# Patient Record
Sex: FEMALE | Race: WHITE | Employment: OTHER | URBAN - NONMETROPOLITAN AREA
[De-identification: names, ages, dates, MRNs, and addresses within clinical notes are randomized per-mention and may not be internally consistent; named-entity substitution may affect disease eponyms.]

---

## 2017-04-06 ENCOUNTER — TELEPHONE (OUTPATIENT)
Dept: PRIMARY CARE CLINIC | Age: 27
End: 2017-04-06

## 2017-04-06 RX ORDER — BENZONATATE 100 MG/1
100 CAPSULE ORAL 3 TIMES DAILY PRN
Qty: 30 CAPSULE | Refills: 0 | Status: SHIPPED | OUTPATIENT
Start: 2017-04-06 | End: 2017-04-13

## 2017-04-06 RX ORDER — AMOXICILLIN AND CLAVULANATE POTASSIUM 875; 125 MG/1; MG/1
1 TABLET, FILM COATED ORAL 2 TIMES DAILY
Qty: 20 TABLET | Refills: 0 | Status: SHIPPED | OUTPATIENT
Start: 2017-04-06 | End: 2017-04-16

## 2017-04-10 ENCOUNTER — OFFICE VISIT (OUTPATIENT)
Dept: PRIMARY CARE CLINIC | Age: 27
End: 2017-04-10
Payer: MEDICARE

## 2017-04-10 VITALS
RESPIRATION RATE: 20 BRPM | DIASTOLIC BLOOD PRESSURE: 72 MMHG | HEART RATE: 88 BPM | BODY MASS INDEX: 22.86 KG/M2 | SYSTOLIC BLOOD PRESSURE: 120 MMHG | TEMPERATURE: 98 F | OXYGEN SATURATION: 98 % | WEIGHT: 125 LBS

## 2017-04-10 DIAGNOSIS — R73.02 IMPAIRED GLUCOSE TOLERANCE: ICD-10-CM

## 2017-04-10 DIAGNOSIS — F84.0 AUTISM DISORDER: ICD-10-CM

## 2017-04-10 DIAGNOSIS — Z23 NEED FOR PROPHYLACTIC VACCINATION AGAINST DIPHTHERIA-TETANUS-PERTUSSIS (DTP): ICD-10-CM

## 2017-04-10 DIAGNOSIS — D75.89 MACROCYTOSIS: ICD-10-CM

## 2017-04-10 DIAGNOSIS — E03.1 CONGENITAL HYPOTHYROIDISM WITHOUT GOITER: ICD-10-CM

## 2017-04-10 DIAGNOSIS — E78.2 MIXED HYPERLIPIDEMIA: ICD-10-CM

## 2017-04-10 DIAGNOSIS — K59.09 CHRONIC CONSTIPATION: ICD-10-CM

## 2017-04-10 DIAGNOSIS — Z00.00 ROUTINE GENERAL MEDICAL EXAMINATION AT A HEALTH CARE FACILITY: Primary | ICD-10-CM

## 2017-04-10 LAB
FOLATE: >20 NG/ML (ref 4.8–37.3)
TSH SERPL DL<=0.05 MIU/L-ACNC: 2.6 UIU/ML (ref 0.27–4.2)
VITAMIN B-12: 835 PG/ML (ref 211–946)

## 2017-04-10 PROCEDURE — 99213 OFFICE O/P EST LOW 20 MIN: CPT | Performed by: FAMILY MEDICINE

## 2017-04-10 PROCEDURE — G8420 CALC BMI NORM PARAMETERS: HCPCS | Performed by: FAMILY MEDICINE

## 2017-04-10 PROCEDURE — 1036F TOBACCO NON-USER: CPT | Performed by: FAMILY MEDICINE

## 2017-04-10 PROCEDURE — G8427 DOCREV CUR MEDS BY ELIG CLIN: HCPCS | Performed by: FAMILY MEDICINE

## 2017-04-10 PROCEDURE — G0439 PPPS, SUBSEQ VISIT: HCPCS | Performed by: FAMILY MEDICINE

## 2017-04-10 RX ORDER — LEVOTHYROXINE SODIUM 0.05 MG/1
TABLET ORAL
Qty: 30 TABLET | Refills: 5 | Status: SHIPPED | OUTPATIENT
Start: 2017-04-10 | End: 2017-09-27 | Stop reason: SDUPTHER

## 2017-04-10 ASSESSMENT — ENCOUNTER SYMPTOMS
COUGH: 0
ABDOMINAL PAIN: 0
CONSTIPATION: 1
EYE PAIN: 0
CHEST TIGHTNESS: 0
SHORTNESS OF BREATH: 0
NAUSEA: 0
SORE THROAT: 0
DIARRHEA: 0
WHEEZING: 0
VOMITING: 0
TROUBLE SWALLOWING: 0
RHINORRHEA: 0
PHOTOPHOBIA: 0
COLOR CHANGE: 0

## 2017-09-27 ENCOUNTER — OFFICE VISIT (OUTPATIENT)
Dept: PRIMARY CARE CLINIC | Age: 27
End: 2017-09-27
Payer: MEDICARE

## 2017-09-27 VITALS
HEIGHT: 62 IN | RESPIRATION RATE: 18 BRPM | BODY MASS INDEX: 21.9 KG/M2 | TEMPERATURE: 98 F | OXYGEN SATURATION: 98 % | WEIGHT: 119 LBS | HEART RATE: 77 BPM

## 2017-09-27 DIAGNOSIS — R73.02 IMPAIRED GLUCOSE TOLERANCE: ICD-10-CM

## 2017-09-27 DIAGNOSIS — F84.0 AUTISM DISORDER: ICD-10-CM

## 2017-09-27 DIAGNOSIS — Z74.1 REQUIRES ASSISTANCE WITH ACTIVITIES OF DAILY LIVING (ADL): ICD-10-CM

## 2017-09-27 DIAGNOSIS — E03.1 CONGENITAL HYPOTHYROIDISM WITHOUT GOITER: ICD-10-CM

## 2017-09-27 DIAGNOSIS — F95.9 TIC DISORDER: Primary | ICD-10-CM

## 2017-09-27 DIAGNOSIS — E78.2 MIXED HYPERLIPIDEMIA: ICD-10-CM

## 2017-09-27 PROCEDURE — G8420 CALC BMI NORM PARAMETERS: HCPCS | Performed by: FAMILY MEDICINE

## 2017-09-27 PROCEDURE — 1036F TOBACCO NON-USER: CPT | Performed by: FAMILY MEDICINE

## 2017-09-27 PROCEDURE — 99358 PROLONG SERVICE W/O CONTACT: CPT | Performed by: FAMILY MEDICINE

## 2017-09-27 PROCEDURE — G8427 DOCREV CUR MEDS BY ELIG CLIN: HCPCS | Performed by: FAMILY MEDICINE

## 2017-09-27 PROCEDURE — 99213 OFFICE O/P EST LOW 20 MIN: CPT | Performed by: FAMILY MEDICINE

## 2017-09-27 RX ORDER — LEVOTHYROXINE SODIUM 0.03 MG/1
TABLET ORAL
Qty: 90 TABLET | Refills: 1 | Status: SHIPPED | OUTPATIENT
Start: 2017-09-27 | End: 2018-03-16 | Stop reason: SDUPTHER

## 2017-09-27 ASSESSMENT — ENCOUNTER SYMPTOMS
ABDOMINAL PAIN: 0
DIARRHEA: 0
WHEEZING: 0
VOMITING: 0
CHEST TIGHTNESS: 0
CONSTIPATION: 0
NAUSEA: 0

## 2017-10-25 DIAGNOSIS — E03.1 CONGENITAL HYPOTHYROIDISM WITHOUT GOITER: ICD-10-CM

## 2017-10-25 DIAGNOSIS — R73.02 IMPAIRED GLUCOSE TOLERANCE: ICD-10-CM

## 2017-10-25 DIAGNOSIS — E78.2 MIXED HYPERLIPIDEMIA: ICD-10-CM

## 2017-10-25 RX ORDER — NORETHINDRONE AND ETHINYL ESTRADIOL 1 MG-35MCG
KIT ORAL
Qty: 28 TABLET | Refills: 11 | Status: SHIPPED | OUTPATIENT
Start: 2017-10-25 | End: 2018-07-03 | Stop reason: SDUPTHER

## 2017-12-24 ENCOUNTER — OFFICE VISIT (OUTPATIENT)
Dept: URGENT CARE | Age: 27
End: 2017-12-24
Payer: MEDICARE

## 2017-12-24 VITALS
WEIGHT: 114 LBS | BODY MASS INDEX: 20.85 KG/M2 | TEMPERATURE: 98.1 F | SYSTOLIC BLOOD PRESSURE: 120 MMHG | OXYGEN SATURATION: 98 % | DIASTOLIC BLOOD PRESSURE: 70 MMHG | HEART RATE: 81 BPM | RESPIRATION RATE: 22 BRPM

## 2017-12-24 DIAGNOSIS — J10.1 INFLUENZA A: Primary | ICD-10-CM

## 2017-12-24 DIAGNOSIS — R52 BODY ACHES: ICD-10-CM

## 2017-12-24 LAB
INFLUENZA A ANTIBODY: POSITIVE
INFLUENZA B ANTIBODY: NEGATIVE

## 2017-12-24 PROCEDURE — 1036F TOBACCO NON-USER: CPT | Performed by: NURSE PRACTITIONER

## 2017-12-24 PROCEDURE — G8427 DOCREV CUR MEDS BY ELIG CLIN: HCPCS | Performed by: NURSE PRACTITIONER

## 2017-12-24 PROCEDURE — 87804 INFLUENZA ASSAY W/OPTIC: CPT | Performed by: NURSE PRACTITIONER

## 2017-12-24 PROCEDURE — G8420 CALC BMI NORM PARAMETERS: HCPCS | Performed by: NURSE PRACTITIONER

## 2017-12-24 PROCEDURE — G8484 FLU IMMUNIZE NO ADMIN: HCPCS | Performed by: NURSE PRACTITIONER

## 2017-12-24 PROCEDURE — 99213 OFFICE O/P EST LOW 20 MIN: CPT | Performed by: NURSE PRACTITIONER

## 2017-12-24 RX ORDER — OSELTAMIVIR PHOSPHATE 75 MG/1
75 CAPSULE ORAL 2 TIMES DAILY
Qty: 10 CAPSULE | Refills: 0 | Status: SHIPPED | OUTPATIENT
Start: 2017-12-24 | End: 2017-12-29

## 2017-12-24 ASSESSMENT — ENCOUNTER SYMPTOMS
GASTROINTESTINAL NEGATIVE: 1
TROUBLE SWALLOWING: 0
ALLERGIC/IMMUNOLOGIC NEGATIVE: 1
EYE REDNESS: 0
SORE THROAT: 0
CONSTIPATION: 0
EYES NEGATIVE: 1
SINUS PRESSURE: 0
ABDOMINAL DISTENTION: 0
RESPIRATORY NEGATIVE: 1
SHORTNESS OF BREATH: 0
ABDOMINAL PAIN: 0
WHEEZING: 0

## 2017-12-24 NOTE — PROGRESS NOTES
[address] [facility] E CARE  [address] [facility] [address]  Dept: [phone]  Loc: [phone]    Nicki Haynes is a 33 y.o. female who presents today for her medical conditions/complaints as noted below. Nicki Haynes is c/o of Congestion and Cough        HPI:     HPI  Pt presents to clinic with runny nose, cough for 2 days. Mom states child does not usually act any different when she is sick. States she is autistic and has trouble communicating. Results for orders placed or performed in visit on 12/24/17   POCT Influenza A/B   Result Value Ref Range    Influenza A Ab positive (A)     Influenza B Ab negative        Past Medical History:   Diagnosis Date    History of underactive thyroid       Past Surgical History:   Procedure Laterality Date    BREAST BIOPSY      Seveal years ago    OTHER SURGICAL HISTORY  7/8/15    fecal disempaction       Family History   Problem Relation Age of Onset    Cancer Father     Cancer Maternal Grandfather      ? Social History   Substance Use Topics    Smoking status: Never Smoker    Smokeless tobacco: Never Used    Alcohol use No      Current Outpatient Prescriptions   Medication Sig Dispense Refill    oseltamivir (TAMIFLU) 75 MG capsule Take 1 capsule by mouth 2 times daily for 5 days 10 capsule 0    TRINTELLIX 5 MG tablet TAKE 1 TABLET BY MOUTH ONCE DAILY 30 tablet 11    NORTREL 1/35, 28, 1-35 MG-MCG per tablet TAKE 1 TABLET BY MOUTH ONCE DAILY 28 tablet 11    levothyroxine (SYNTHROID) 25 MCG tablet Take 1 tablet daily 90 tablet 1    calcium carbonate (OSCAL) 500 MG TABS tablet Take 500 mg by mouth daily      docusate sodium (COLACE) 100 MG capsule Take 100 mg by mouth 2 times daily      FIBER FORMULA PO Take by mouth      polyethylene glycol (GLYCOLAX) powder Take 17 g by mouth daily       No current facility-administered medications for this visit.       Allergies   Allergen Reactions    health maintenance. Instructed to continue current medications, diet and exercise. Patient agreed with treatment plan. Follow up as directed. Patient Instructions   1. Take tamiflu as prescribed  2. Give tylenol/motrin as needed for fever and may alternate every 4-6 hours as needed  3.  Increase fluids and make sure she urinates at least every 12 hours        Electronically signed by Neris Houser CNP on 12/24/2017 at 3:52 PM

## 2018-03-05 ENCOUNTER — TELEPHONE (OUTPATIENT)
Dept: PRIMARY CARE CLINIC | Age: 28
End: 2018-03-05

## 2018-03-16 DIAGNOSIS — E78.2 MIXED HYPERLIPIDEMIA: ICD-10-CM

## 2018-03-16 DIAGNOSIS — R73.02 IMPAIRED GLUCOSE TOLERANCE: ICD-10-CM

## 2018-03-16 DIAGNOSIS — E03.1 CONGENITAL HYPOTHYROIDISM WITHOUT GOITER: ICD-10-CM

## 2018-03-16 RX ORDER — LEVOTHYROXINE SODIUM 0.03 MG/1
TABLET ORAL
Qty: 90 TABLET | Refills: 0 | Status: SHIPPED | OUTPATIENT
Start: 2018-03-16 | End: 2018-07-03 | Stop reason: SDUPTHER

## 2018-07-03 ENCOUNTER — OFFICE VISIT (OUTPATIENT)
Dept: PRIMARY CARE CLINIC | Age: 28
End: 2018-07-03
Payer: MEDICARE

## 2018-07-03 ENCOUNTER — TELEPHONE (OUTPATIENT)
Dept: PRIMARY CARE CLINIC | Age: 28
End: 2018-07-03

## 2018-07-03 VITALS
OXYGEN SATURATION: 98 % | HEART RATE: 68 BPM | BODY MASS INDEX: 20.66 KG/M2 | DIASTOLIC BLOOD PRESSURE: 82 MMHG | TEMPERATURE: 98 F | SYSTOLIC BLOOD PRESSURE: 98 MMHG | RESPIRATION RATE: 18 BRPM | HEIGHT: 64 IN | WEIGHT: 121 LBS

## 2018-07-03 DIAGNOSIS — F84.0 AUTISM DISORDER: ICD-10-CM

## 2018-07-03 DIAGNOSIS — R53.82 CHRONIC FATIGUE: ICD-10-CM

## 2018-07-03 DIAGNOSIS — L20.89 OTHER ATOPIC DERMATITIS: ICD-10-CM

## 2018-07-03 DIAGNOSIS — E78.2 MIXED HYPERLIPIDEMIA: ICD-10-CM

## 2018-07-03 DIAGNOSIS — F95.1 CHRONIC MOTOR TIC DISORDER: ICD-10-CM

## 2018-07-03 DIAGNOSIS — R53.82 CHRONIC FATIGUE: Primary | ICD-10-CM

## 2018-07-03 DIAGNOSIS — R73.02 IMPAIRED GLUCOSE TOLERANCE: ICD-10-CM

## 2018-07-03 DIAGNOSIS — Z74.1 REQUIRES ASSISTANCE WITH ACTIVITIES OF DAILY LIVING (ADL): ICD-10-CM

## 2018-07-03 DIAGNOSIS — E03.1 CONGENITAL HYPOTHYROIDISM WITHOUT GOITER: ICD-10-CM

## 2018-07-03 LAB
ALBUMIN SERPL-MCNC: 4.8 G/DL (ref 3.5–5.2)
ALP BLD-CCNC: 79 U/L (ref 35–104)
ALT SERPL-CCNC: 18 U/L (ref 5–33)
ANION GAP SERPL CALCULATED.3IONS-SCNC: 17 MMOL/L (ref 7–19)
AST SERPL-CCNC: 22 U/L (ref 5–32)
BASOPHILS ABSOLUTE: 0.1 K/UL (ref 0–0.2)
BASOPHILS RELATIVE PERCENT: 1 % (ref 0–1)
BILIRUB SERPL-MCNC: <0.2 MG/DL (ref 0.2–1.2)
BUN BLDV-MCNC: 11 MG/DL (ref 6–20)
CALCIUM SERPL-MCNC: 9.8 MG/DL (ref 8.6–10)
CHLORIDE BLD-SCNC: 101 MMOL/L (ref 98–111)
CO2: 22 MMOL/L (ref 22–29)
CREAT SERPL-MCNC: 0.6 MG/DL (ref 0.5–0.9)
EOSINOPHILS ABSOLUTE: 0 K/UL (ref 0–0.6)
EOSINOPHILS RELATIVE PERCENT: 0.6 % (ref 0–5)
FOLATE: >20 NG/ML (ref 4.8–37.3)
GFR NON-AFRICAN AMERICAN: >60
GLUCOSE BLD-MCNC: 82 MG/DL (ref 74–109)
HCT VFR BLD CALC: 43.1 % (ref 37–47)
HEMOGLOBIN: 14.4 G/DL (ref 12–16)
LYMPHOCYTES ABSOLUTE: 1.8 K/UL (ref 1.1–4.5)
LYMPHOCYTES RELATIVE PERCENT: 35.1 % (ref 20–40)
MCH RBC QN AUTO: 33 PG (ref 27–31)
MCHC RBC AUTO-ENTMCNC: 33.4 G/DL (ref 33–37)
MCV RBC AUTO: 98.6 FL (ref 81–99)
MONOCYTES ABSOLUTE: 0.5 K/UL (ref 0–0.9)
MONOCYTES RELATIVE PERCENT: 9.8 % (ref 0–10)
NEUTROPHILS ABSOLUTE: 2.7 K/UL (ref 1.5–7.5)
NEUTROPHILS RELATIVE PERCENT: 52.3 % (ref 50–65)
PDW BLD-RTO: 11.8 % (ref 11.5–14.5)
PLATELET # BLD: 285 K/UL (ref 130–400)
PMV BLD AUTO: 11.1 FL (ref 9.4–12.3)
POTASSIUM SERPL-SCNC: 3.8 MMOL/L (ref 3.5–5)
RBC # BLD: 4.37 M/UL (ref 4.2–5.4)
SODIUM BLD-SCNC: 140 MMOL/L (ref 136–145)
TOTAL PROTEIN: 8.1 G/DL (ref 6.6–8.7)
TSH SERPL DL<=0.05 MIU/L-ACNC: 2.84 UIU/ML (ref 0.27–4.2)
VITAMIN B-12: 1032 PG/ML (ref 211–946)
WBC # BLD: 5.1 K/UL (ref 4.8–10.8)

## 2018-07-03 PROCEDURE — G8420 CALC BMI NORM PARAMETERS: HCPCS | Performed by: FAMILY MEDICINE

## 2018-07-03 PROCEDURE — 99214 OFFICE O/P EST MOD 30 MIN: CPT | Performed by: FAMILY MEDICINE

## 2018-07-03 PROCEDURE — G8427 DOCREV CUR MEDS BY ELIG CLIN: HCPCS | Performed by: FAMILY MEDICINE

## 2018-07-03 PROCEDURE — 1036F TOBACCO NON-USER: CPT | Performed by: FAMILY MEDICINE

## 2018-07-03 RX ORDER — LEVOTHYROXINE SODIUM 0.03 MG/1
TABLET ORAL
Qty: 90 TABLET | Refills: 0 | Status: SHIPPED | OUTPATIENT
Start: 2018-07-03 | End: 2018-09-24 | Stop reason: SDUPTHER

## 2018-07-03 ASSESSMENT — PATIENT HEALTH QUESTIONNAIRE - PHQ9
SUM OF ALL RESPONSES TO PHQ9 QUESTIONS 1 & 2: 0
2. FEELING DOWN, DEPRESSED OR HOPELESS: 0
1. LITTLE INTEREST OR PLEASURE IN DOING THINGS: 0
SUM OF ALL RESPONSES TO PHQ QUESTIONS 1-9: 0

## 2018-07-03 NOTE — PROGRESS NOTES
Pulse 68   Temp 98 °F (36.7 °C) (Temporal)   Resp 18   Ht 5' 4\" (1.626 m)   Wt 121 lb (54.9 kg)   SpO2 98%   BMI 20.77 kg/m²     Physical Exam   Constitutional: She is oriented to person, place, and time. She appears well-developed and well-nourished. No distress. HENT:   Head: Normocephalic and atraumatic. Neck: No tracheal tenderness present. No tracheal deviation present. No thyroid mass and no thyromegaly present. Cardiovascular: Normal rate, regular rhythm and normal heart sounds. No murmur heard. Pulmonary/Chest: Effort normal and breath sounds normal. No stridor. No respiratory distress. She has no wheezes. She has no rales. Abdominal: Soft. Bowel sounds are normal. She exhibits no distension. There is no tenderness. Musculoskeletal:   No pretibial edema b/l. Neurological: She is alert and oriented to person, place, and time. Skin: Skin is warm and dry. Rash noted. Rash is maculopapular (dry skin rash diffusely on lower extremities). She is not diaphoretic. No cyanosis. Psychiatric:   Avoid and behavior of motor tics, no eye contact but pleasant with provider. Some fretful resistance noted on skin examination   Nursing note and vitals reviewed. Assessment:    ICD-10-CM ICD-9-CM    1. Chronic fatigue R53.82 780.79 TSH without Reflex      Comprehensive Metabolic Panel      TSH 3RD GENERATION      Vitamin B12 & Folate      CBC Auto Differential   2. Impaired glucose tolerance R73.02 790.22 levothyroxine (SYNTHROID) 25 MCG tablet      norethindrone-ethinyl estradiol (NORTREL 1/35, 28,) 1-35 MG-MCG per tablet      VORTIoxetine (TRINTELLIX) 5 MG tablet   3. Mixed hyperlipidemia E78.2 272.2 levothyroxine (SYNTHROID) 25 MCG tablet      norethindrone-ethinyl estradiol (NORTREL 1/35, 28,) 1-35 MG-MCG per tablet      VORTIoxetine (TRINTELLIX) 5 MG tablet   4.  Congenital hypothyroidism without goiter E03.1 243 levothyroxine (SYNTHROID) 25 MCG tablet      norethindrone-ethinyl estradiol Patient Instructions   Take 1/2 a pill of trintellix daily for 6 days, then every other day 1/2 a pill for 6 days total, then stop. So in 12 days, she will off the medicine. Get your labs checked in [address]. Use scent free lotion or vaseline twice daily on skin spots. Patient given educational handouts and has had all questions answered. Patient voices understanding and agrees to plans along with risks and benefits of plan. Patient is instructed to continue prior meds, diet, and exercise plans unless instructed otherwise. Patient agrees to follow up as instructed and sooner if needed. Patient agrees to go to ER if condition becomes emergent. Notes may be completed with dictation device and spelling errors may occur.       Return in about 3 months (around 10/3/2018) for annual.

## 2018-07-03 NOTE — TELEPHONE ENCOUNTER
Per Dr. Isabel Kwan,         Please send letter to pt to let them know everything looked fine and no changes to be made at this time. Mailed pt letter with above information and results.  PP, LPN

## 2018-07-05 LAB — TSH, 3RD GENERATION: 3.08 MU/L (ref 0.3–4)

## 2018-09-17 ENCOUNTER — TELEPHONE (OUTPATIENT)
Dept: PRIMARY CARE CLINIC | Age: 28
End: 2018-09-17

## 2018-11-19 ENCOUNTER — OFFICE VISIT (OUTPATIENT)
Dept: PRIMARY CARE CLINIC | Age: 28
End: 2018-11-19
Payer: MEDICARE

## 2018-11-19 VITALS
OXYGEN SATURATION: 98 % | HEART RATE: 79 BPM | SYSTOLIC BLOOD PRESSURE: 132 MMHG | DIASTOLIC BLOOD PRESSURE: 78 MMHG | WEIGHT: 120 LBS | BODY MASS INDEX: 21.26 KG/M2 | HEIGHT: 63 IN

## 2018-11-19 DIAGNOSIS — K59.04 FUNCTIONAL CONSTIPATION: Primary | ICD-10-CM

## 2018-11-19 DIAGNOSIS — E03.1 CONGENITAL HYPOTHYROIDISM WITHOUT GOITER: ICD-10-CM

## 2018-11-19 DIAGNOSIS — E78.2 MIXED HYPERLIPIDEMIA: ICD-10-CM

## 2018-11-19 DIAGNOSIS — R73.02 IMPAIRED GLUCOSE TOLERANCE: ICD-10-CM

## 2018-11-19 PROCEDURE — 99213 OFFICE O/P EST LOW 20 MIN: CPT | Performed by: FAMILY MEDICINE

## 2018-11-19 PROCEDURE — 1036F TOBACCO NON-USER: CPT | Performed by: FAMILY MEDICINE

## 2018-11-19 PROCEDURE — G0008 ADMIN INFLUENZA VIRUS VAC: HCPCS | Performed by: FAMILY MEDICINE

## 2018-11-19 PROCEDURE — G8427 DOCREV CUR MEDS BY ELIG CLIN: HCPCS | Performed by: FAMILY MEDICINE

## 2018-11-19 PROCEDURE — G8420 CALC BMI NORM PARAMETERS: HCPCS | Performed by: FAMILY MEDICINE

## 2018-11-19 PROCEDURE — 90686 IIV4 VACC NO PRSV 0.5 ML IM: CPT | Performed by: FAMILY MEDICINE

## 2018-11-19 PROCEDURE — G8482 FLU IMMUNIZE ORDER/ADMIN: HCPCS | Performed by: FAMILY MEDICINE

## 2018-11-19 RX ORDER — LEVOTHYROXINE SODIUM 0.03 MG/1
TABLET ORAL
Qty: 90 TABLET | Refills: 3 | Status: SHIPPED | OUTPATIENT
Start: 2018-11-19 | End: 2019-11-18 | Stop reason: SDUPTHER

## 2018-11-19 NOTE — PROGRESS NOTES
REFILLS UNTIL SEEN IN OFFICE. Medications Discontinued During This Encounter   Medication Reason    docusate sodium (COLACE) 100 MG capsule Therapy completed    levothyroxine (SYNTHROID) 25 MCG tablet REORDER     Patient Instructions   Stay on current medications. Patient given educational handouts and has had all questions answered. Patient voices understanding and agrees to plans along with risks and benefits of plan. Patient isinstructed to continue prior meds, diet, and exercise plans unless instructed otherwise. Patient agrees to follow up as instructed and sooner if needed. Patient agrees to go to ER if condition becomes emergent. Notesmay be completed with dictation device and spelling errors may occur. Return in about 6 months (around 5/19/2019) for medicare AW.

## 2019-03-11 ENCOUNTER — TELEPHONE (OUTPATIENT)
Dept: PRIMARY CARE CLINIC | Age: 29
End: 2019-03-11

## 2019-03-11 RX ORDER — LACTOSE
POWDER (GRAM) MISCELLANEOUS
Qty: 1 G | Refills: 0 | Status: SHIPPED | OUTPATIENT
Start: 2019-03-11 | End: 2020-02-13 | Stop reason: SDUPTHER

## 2019-03-12 ENCOUNTER — OFFICE VISIT (OUTPATIENT)
Dept: PRIMARY CARE CLINIC | Age: 29
End: 2019-03-12
Payer: MEDICARE

## 2019-03-12 ENCOUNTER — HOSPITAL ENCOUNTER (OUTPATIENT)
Dept: GENERAL RADIOLOGY | Age: 29
Discharge: HOME | End: 2019-03-12
Payer: MEDICARE

## 2019-03-12 VITALS
SYSTOLIC BLOOD PRESSURE: 126 MMHG | HEART RATE: 76 BPM | TEMPERATURE: 99 F | DIASTOLIC BLOOD PRESSURE: 72 MMHG | WEIGHT: 122 LBS | BODY MASS INDEX: 21.62 KG/M2 | OXYGEN SATURATION: 98 % | HEIGHT: 63 IN

## 2019-03-12 DIAGNOSIS — K59.03 DRUG-INDUCED CONSTIPATION: ICD-10-CM

## 2019-03-12 DIAGNOSIS — K59.03 DRUG-INDUCED CONSTIPATION: Primary | ICD-10-CM

## 2019-03-12 DIAGNOSIS — N92.6 MENSTRUAL ABNORMALITY: ICD-10-CM

## 2019-03-12 PROCEDURE — 1036F TOBACCO NON-USER: CPT | Performed by: FAMILY MEDICINE

## 2019-03-12 PROCEDURE — 74018 RADEX ABDOMEN 1 VIEW: CPT

## 2019-03-12 PROCEDURE — G8482 FLU IMMUNIZE ORDER/ADMIN: HCPCS | Performed by: FAMILY MEDICINE

## 2019-03-12 PROCEDURE — G8420 CALC BMI NORM PARAMETERS: HCPCS | Performed by: FAMILY MEDICINE

## 2019-03-12 PROCEDURE — G8427 DOCREV CUR MEDS BY ELIG CLIN: HCPCS | Performed by: FAMILY MEDICINE

## 2019-03-12 PROCEDURE — 99214 OFFICE O/P EST MOD 30 MIN: CPT | Performed by: FAMILY MEDICINE

## 2019-03-12 RX ORDER — LACTULOSE 10 G/15ML
SOLUTION ORAL
Qty: 270 ML | Refills: 0 | Status: SHIPPED | OUTPATIENT
Start: 2019-03-12 | End: 2020-02-13 | Stop reason: SDUPTHER

## 2019-03-12 ASSESSMENT — ENCOUNTER SYMPTOMS
CONSTIPATION: 1
COUGH: 0
SHORTNESS OF BREATH: 0
ABDOMINAL PAIN: 0
DIARRHEA: 0
CHEST TIGHTNESS: 0
WHEEZING: 0
VOMITING: 0
NAUSEA: 0
ABDOMINAL DISTENTION: 1

## 2019-04-05 ENCOUNTER — ANESTHESIA EVENT (OUTPATIENT)
Dept: OPERATING ROOM | Age: 29
End: 2019-04-05

## 2019-04-12 ENCOUNTER — HOSPITAL ENCOUNTER (OUTPATIENT)
Age: 29
Setting detail: OUTPATIENT SURGERY
Discharge: HOME | End: 2019-04-12
Attending: OPHTHALMOLOGY | Admitting: OPHTHALMOLOGY

## 2019-04-12 ENCOUNTER — ANESTHESIA (OUTPATIENT)
Dept: OPERATING ROOM | Age: 29
End: 2019-04-12

## 2019-04-12 VITALS
RESPIRATION RATE: 2 BRPM | OXYGEN SATURATION: 97 % | SYSTOLIC BLOOD PRESSURE: 118 MMHG | DIASTOLIC BLOOD PRESSURE: 57 MMHG

## 2019-04-12 VITALS
SYSTOLIC BLOOD PRESSURE: 114 MMHG | OXYGEN SATURATION: 97 % | HEART RATE: 73 BPM | WEIGHT: 122 LBS | RESPIRATION RATE: 18 BRPM | HEIGHT: 63 IN | BODY MASS INDEX: 21.62 KG/M2 | DIASTOLIC BLOOD PRESSURE: 71 MMHG

## 2019-04-12 PROCEDURE — 92018 COMPL OPH EXAM GENERAL ANES: CPT

## 2019-04-12 PROCEDURE — G8907 PT DOC NO EVENTS ON DISCHARG: HCPCS | Performed by: NURSE PRACTITIONER

## 2019-04-12 PROCEDURE — G8918 PT W/O PREOP ORDER IV AB PRO: HCPCS | Performed by: NURSE PRACTITIONER

## 2019-04-12 RX ORDER — SODIUM CHLORIDE, SODIUM LACTATE, POTASSIUM CHLORIDE, CALCIUM CHLORIDE 600; 310; 30; 20 MG/100ML; MG/100ML; MG/100ML; MG/100ML
INJECTION, SOLUTION INTRAVENOUS CONTINUOUS
Status: DISCONTINUED | OUTPATIENT
Start: 2019-04-12 | End: 2019-04-12 | Stop reason: HOSPADM

## 2019-04-12 RX ORDER — MIDAZOLAM HYDROCHLORIDE 1 MG/ML
INJECTION INTRAMUSCULAR; INTRAVENOUS PRN
Status: DISCONTINUED | OUTPATIENT
Start: 2019-04-12 | End: 2019-04-12 | Stop reason: SDUPTHER

## 2019-04-12 RX ORDER — PROPOFOL 10 MG/ML
INJECTION, EMULSION INTRAVENOUS PRN
Status: DISCONTINUED | OUTPATIENT
Start: 2019-04-12 | End: 2019-04-12 | Stop reason: SDUPTHER

## 2019-04-12 RX ORDER — LIDOCAINE HYDROCHLORIDE 10 MG/ML
INJECTION, SOLUTION INFILTRATION; PERINEURAL PRN
Status: DISCONTINUED | OUTPATIENT
Start: 2019-04-12 | End: 2019-04-12 | Stop reason: SDUPTHER

## 2019-04-12 RX ORDER — SODIUM CHLORIDE, SODIUM LACTATE, POTASSIUM CHLORIDE, CALCIUM CHLORIDE 600; 310; 30; 20 MG/100ML; MG/100ML; MG/100ML; MG/100ML
INJECTION, SOLUTION INTRAVENOUS CONTINUOUS PRN
Status: DISCONTINUED | OUTPATIENT
Start: 2019-04-12 | End: 2019-04-12 | Stop reason: SDUPTHER

## 2019-04-12 RX ADMIN — LIDOCAINE HYDROCHLORIDE 30 MG: 10 INJECTION, SOLUTION INFILTRATION; PERINEURAL at 07:02

## 2019-04-12 RX ADMIN — SODIUM CHLORIDE, SODIUM LACTATE, POTASSIUM CHLORIDE, CALCIUM CHLORIDE: 600; 310; 30; 20 INJECTION, SOLUTION INTRAVENOUS at 06:58

## 2019-04-12 RX ADMIN — PROPOFOL 150 MG: 10 INJECTION, EMULSION INTRAVENOUS at 07:02

## 2019-04-12 RX ADMIN — SODIUM CHLORIDE, SODIUM LACTATE, POTASSIUM CHLORIDE, CALCIUM CHLORIDE: 600; 310; 30; 20 INJECTION, SOLUTION INTRAVENOUS at 06:57

## 2019-04-12 RX ADMIN — MIDAZOLAM HYDROCHLORIDE 2 MG: 1 INJECTION INTRAMUSCULAR; INTRAVENOUS at 06:53

## 2019-04-12 NOTE — DISCHARGE SUMMARY
Physician Discharge Summary     Patient ID:  Chetna Villalobos  [id number]  49 y.o.  1990    Admit date: 4/12/2019    Discharge date and time: No discharge date for patient encounter. Admitting Physician: Vinay Vee MD     Discharge Physician: same    Admission Diagnoses: Autism    Discharge Diagnoses: Autism, Hyperopia, Astigmatism    Admission Condition: good    Discharged Condition: good    Indication for Admission: exam under anesthesia    Hospital Course: see op note    Discharge Exam:  /84   Pulse 84   Resp 18   Ht 5' 3\" (1.6 m)   Wt 122 lb (55.3 kg)   SpO2 93%   BMI 21.61 kg/m²   General appearance: alert, appears stated age and cooperative  Head: Normocephalic, without obvious abnormality, atraumatic  Lungs: clear to auscultation bilaterally  Heart: regular rate and rhythm, S1, S2 normal, no murmur, click, rub or gallop  Extremities: extremities normal, atraumatic, no cyanosis or edema  Neurologic: autism    Disposition: home    In process/preliminary results:  Outstanding Order Results     No orders found from 3/14/2019 to 4/13/2019. Patient Instructions:   Current Discharge Medication List      CONTINUE these medications which have NOT CHANGED    Details   lactulose (CHRONULAC) 10 GM/15ML solution Take 30 mL every 2 hrs up 3 times in 24 hrs until large BM produced  Qty: 270 mL, Refills: 0    Associated Diagnoses: Drug-induced constipation      Lactose POWD Take 20 mg  Every two hours for 2 days until produced a good movement  Qty: 1 g, Refills: 0      Docusate Calcium (STOOL SOFTENER PO) Take by mouth      levothyroxine (SYNTHROID) 25 MCG tablet TAKE 1 TABLET BY MOUTH ONCE DAILY  Qty: 90 tablet, Refills: 3    Comments: NO FURTHER REFILLS UNTIL SEEN IN OFFICE. Associated Diagnoses: Impaired glucose tolerance; Mixed hyperlipidemia;  Congenital hypothyroidism without goiter      norethindrone-ethinyl estradiol (NORTREL 1/35, 28,) 1-35 MG-MCG per tablet TAKE 1 TABLET BY MOUTH ONCE DAILY  Qty: 28 tablet, Refills: 11    Associated Diagnoses: Impaired glucose tolerance; Mixed hyperlipidemia; Congenital hypothyroidism without goiter      calcium carbonate (OSCAL) 500 MG TABS tablet Take 500 mg by mouth daily      FIBER FORMULA PO Take by mouth      polyethylene glycol (GLYCOLAX) powder Take 17 g by mouth daily           Activity: activity as tolerated  Diet: regular diet    Follow-up with [facility] in 1 week.     Signed:  Adele Fortune MD  4/12/2019  7:28 AM

## 2019-04-12 NOTE — BRIEF OP NOTE
Brief Postoperative Note  ______________________________________________________________    Patient: Lisa Montero  YOB: 1990  MRN: [id number]  Date of Procedure: 4/12/2019    Pre-Op Diagnosis: autism, exam under anesthesia    Post-Op Diagnosis: Same       Procedure(s):  EXAMINATION UNDER ANESTHESIA    Anesthesia: General    Surgeon(s):  Brando Coker MD    Complications: None      Findings: see op note    Brando Coker MD  Date: 4/12/2019  Time: 7:20 AM

## 2019-11-11 ENCOUNTER — IMMUNIZATION (OUTPATIENT)
Dept: URGENT CARE | Age: 29
End: 2019-11-11
Payer: MEDICARE

## 2019-11-11 PROCEDURE — G0008 ADMIN INFLUENZA VIRUS VAC: HCPCS | Performed by: NURSE PRACTITIONER

## 2019-11-11 PROCEDURE — 90686 IIV4 VACC NO PRSV 0.5 ML IM: CPT | Performed by: NURSE PRACTITIONER

## 2019-11-18 DIAGNOSIS — E03.1 CONGENITAL HYPOTHYROIDISM WITHOUT GOITER: ICD-10-CM

## 2019-11-18 DIAGNOSIS — E78.2 MIXED HYPERLIPIDEMIA: ICD-10-CM

## 2019-11-18 DIAGNOSIS — R73.02 IMPAIRED GLUCOSE TOLERANCE: ICD-10-CM

## 2019-11-18 RX ORDER — LEVOTHYROXINE SODIUM 0.03 MG/1
TABLET ORAL
Qty: 90 TABLET | Refills: 0 | Status: SHIPPED | OUTPATIENT
Start: 2019-11-18 | End: 2020-02-13 | Stop reason: SDUPTHER

## 2020-02-13 ENCOUNTER — OFFICE VISIT (OUTPATIENT)
Dept: PRIMARY CARE CLINIC | Age: 30
End: 2020-02-13
Payer: MEDICARE

## 2020-02-13 VITALS
WEIGHT: 113 LBS | BODY MASS INDEX: 20.02 KG/M2 | OXYGEN SATURATION: 99 % | HEART RATE: 98 BPM | TEMPERATURE: 98 F | RESPIRATION RATE: 20 BRPM | DIASTOLIC BLOOD PRESSURE: 72 MMHG | SYSTOLIC BLOOD PRESSURE: 120 MMHG

## 2020-02-13 DIAGNOSIS — R53.82 CHRONIC FATIGUE: ICD-10-CM

## 2020-02-13 DIAGNOSIS — E03.1 CONGENITAL HYPOTHYROIDISM WITHOUT GOITER: ICD-10-CM

## 2020-02-13 LAB
ALBUMIN SERPL-MCNC: 5.1 G/DL (ref 3.5–5.2)
ALP BLD-CCNC: 77 U/L (ref 35–104)
ALT SERPL-CCNC: 22 U/L (ref 5–33)
ANION GAP SERPL CALCULATED.3IONS-SCNC: 15 MMOL/L (ref 7–19)
AST SERPL-CCNC: 22 U/L (ref 5–32)
BILIRUB SERPL-MCNC: <0.2 MG/DL (ref 0.2–1.2)
BUN BLDV-MCNC: 10 MG/DL (ref 6–20)
CALCIUM SERPL-MCNC: 9.5 MG/DL (ref 8.6–10)
CHLORIDE BLD-SCNC: 103 MMOL/L (ref 98–111)
CO2: 23 MMOL/L (ref 22–29)
CREAT SERPL-MCNC: 0.7 MG/DL (ref 0.5–0.9)
GFR NON-AFRICAN AMERICAN: >60
GLUCOSE BLD-MCNC: 88 MG/DL (ref 74–109)
POTASSIUM SERPL-SCNC: 3.8 MMOL/L (ref 3.5–5)
SODIUM BLD-SCNC: 141 MMOL/L (ref 136–145)
TOTAL PROTEIN: 8.5 G/DL (ref 6.6–8.7)

## 2020-02-13 PROCEDURE — 1036F TOBACCO NON-USER: CPT | Performed by: FAMILY MEDICINE

## 2020-02-13 PROCEDURE — G8482 FLU IMMUNIZE ORDER/ADMIN: HCPCS | Performed by: FAMILY MEDICINE

## 2020-02-13 PROCEDURE — G8427 DOCREV CUR MEDS BY ELIG CLIN: HCPCS | Performed by: FAMILY MEDICINE

## 2020-02-13 PROCEDURE — G8420 CALC BMI NORM PARAMETERS: HCPCS | Performed by: FAMILY MEDICINE

## 2020-02-13 PROCEDURE — 99214 OFFICE O/P EST MOD 30 MIN: CPT | Performed by: FAMILY MEDICINE

## 2020-02-13 RX ORDER — LEVOTHYROXINE SODIUM 0.03 MG/1
TABLET ORAL
Qty: 90 TABLET | Refills: 0 | Status: SHIPPED | OUTPATIENT
Start: 2020-02-13 | End: 2020-06-04

## 2020-02-13 RX ORDER — LACTULOSE 10 G/15ML
SOLUTION ORAL
Qty: 270 ML | Refills: 0 | Status: SHIPPED | OUTPATIENT
Start: 2020-02-13 | End: 2022-03-15 | Stop reason: SDUPTHER

## 2020-02-13 RX ORDER — LACTOSE
POWDER (GRAM) MISCELLANEOUS
Qty: 1 G | Refills: 0 | Status: SHIPPED | OUTPATIENT
Start: 2020-02-13 | End: 2022-09-08 | Stop reason: ALTCHOICE

## 2020-02-13 ASSESSMENT — PATIENT HEALTH QUESTIONNAIRE - PHQ9
SUM OF ALL RESPONSES TO PHQ QUESTIONS 1-9: 0
SUM OF ALL RESPONSES TO PHQ QUESTIONS 1-9: 0
2. FEELING DOWN, DEPRESSED OR HOPELESS: 0
SUM OF ALL RESPONSES TO PHQ9 QUESTIONS 1 & 2: 0
1. LITTLE INTEREST OR PLEASURE IN DOING THINGS: 0

## 2020-02-13 ASSESSMENT — ENCOUNTER SYMPTOMS
NAUSEA: 0
WHEEZING: 0
ABDOMINAL PAIN: 0
SHORTNESS OF BREATH: 0
BLOOD IN STOOL: 0
COUGH: 0
DIARRHEA: 0
CONSTIPATION: 0
VOMITING: 0
CHEST TIGHTNESS: 0

## 2020-02-13 NOTE — PROGRESS NOTES
Olivia Carr is a 35 y.o. female who presents today for   Chief Complaint   Patient presents with    Medication Refill       HPI  Patient is here for medication refill. Pt's mother notes pt eats a lot of gluten-free products and that she has lost weight. Mother notes pt regularly uses Miralax and fiber supplement. She notes concern about cerumen in L ear. No change in PMH, family, social, or surgical history unless mentioned above. Review of Systems   Constitutional: Negative for chills and fever. Respiratory: Negative for cough, chest tightness, shortness of breath and wheezing. Cardiovascular: Negative for chest pain, palpitations and leg swelling. Gastrointestinal: Negative for abdominal pain, blood in stool, constipation, diarrhea, nausea and vomiting. Genitourinary: Negative for difficulty urinating, dysuria, frequency and hematuria. Past Medical History:   Diagnosis Date    History of underactive thyroid        Current Outpatient Medications   Medication Sig Dispense Refill    norethindrone-ethinyl estradiol ([place] Rival 1/35, 28,) 1-35 MG-MCG per tablet TAKE 1 TABLET BY MOUTH ONCE DAILY 28 tablet 1    levothyroxine (SYNTHROID) 25 MCG tablet Take one tablet daily 90 tablet 0    lactulose (CHRONULAC) 10 GM/15ML solution Take 30 mL every 2 hrs up 3 times in 24 hrs until large BM produced 270 mL 0    Lactose POWD Take 20 mg  Every two hours for 2 days until produced a good movement 1 g 0    Docusate Calcium (STOOL SOFTENER PO) Take by mouth      calcium carbonate (OSCAL) 500 MG TABS tablet Take 500 mg by mouth daily      FIBER FORMULA PO Take by mouth      polyethylene glycol (GLYCOLAX) powder Take 17 g by mouth daily       No current facility-administered medications for this visit.         Allergies   Allergen Reactions    Latex     Betadine [Povidone Iodine]     Sulfa Antibiotics        Past Surgical History:   Procedure Laterality Date    BREAST BIOPSY      Seveal years ago  OTHER SURGICAL HISTORY  7/8/15    fecal disempaction       Social History     Tobacco Use    Smoking status: Never Smoker    Smokeless tobacco: Never Used   Substance Use Topics    Alcohol use: No     Alcohol/week: 0.0 standard drinks    Drug use: No       Family History   Problem Relation Age of Onset    Cancer Father     Cancer Maternal Grandfather         ? /72   Pulse 98   Temp 98 °F (36.7 °C)   Resp 20   Wt 113 lb (51.3 kg)   SpO2 99%   BMI 20.02 kg/m²     Physical Exam  Vitals signs and nursing note reviewed. Constitutional:       General: She is not in acute distress. Appearance: She is well-developed. She is not diaphoretic. HENT:      Head: Normocephalic and atraumatic. Cardiovascular:      Rate and Rhythm: Normal rate and regular rhythm. Heart sounds: Normal heart sounds. No murmur. Pulmonary:      Effort: Pulmonary effort is normal. No respiratory distress. Breath sounds: Normal breath sounds. No wheezing or rales. Abdominal:      General: Bowel sounds are normal. There is no distension. Palpations: Abdomen is soft. Tenderness: There is no abdominal tenderness. Musculoskeletal:      Comments: No pretibial edema b/l. Skin:     General: Skin is warm and dry. Neurological:      Mental Status: She is alert and oriented to person, place, and time. Assessment:    ICD-10-CM    1. Autism disorder F84.0    2. Impaired glucose tolerance R73.02 norethindrone-ethinyl estradiol (NORTREL 1/35, 28,) 1-35 MG-MCG per tablet     levothyroxine (SYNTHROID) 25 MCG tablet   3. Mixed hyperlipidemia E78.2 norethindrone-ethinyl estradiol (NORTREL 1/35, 28,) 1-35 MG-MCG per tablet     levothyroxine (SYNTHROID) 25 MCG tablet   4.  Congenital hypothyroidism without goiter E03.1 norethindrone-ethinyl estradiol (NORTREL 1/35, 28,) 1-35 MG-MCG per tablet     levothyroxine (SYNTHROID) 25 MCG tablet     TSH 3RD GENERATION   5. Drug-induced constipation K59.03 lactulose (CHRONULAC) 10 GM/15ML solution   6. Chronic idiopathic constipation K59.04        Plan: Will need lab f/u based on high risk hypothyroidism. Continue current GI regimen. Orders Placed This Encounter   Procedures    TSH 3RD GENERATION     Standing Status:   Standing     Number of Occurrences:   15     Standing Expiration Date:   1/26/2031     Orders Placed This Encounter   Medications    norethindrone-ethinyl estradiol ([place] Flood 1/35, 28,) 1-35 MG-MCG per tablet     Sig: TAKE 1 TABLET BY MOUTH ONCE DAILY     Dispense:  28 tablet     Refill:  1     NO FURTHER REFILLS UNTIL SEEN IN OFFICE.  levothyroxine (SYNTHROID) 25 MCG tablet     Sig: Take one tablet daily     Dispense:  90 tablet     Refill:  0     $    lactulose (CHRONULAC) 10 GM/15ML solution     Sig: Take 30 mL every 2 hrs up 3 times in 24 hrs until large BM produced     Dispense:  270 mL     Refill:  0    Lactose POWD     Sig: Take 20 mg  Every two hours for 2 days until produced a good movement     Dispense:  1 g     Refill:  0     Medications Discontinued During This Encounter   Medication Reason    norethindrone-ethinyl estradiol (NORTREL 1/35, 28,) 1-35 MG-MCG per tablet REORDER    levothyroxine (SYNTHROID) 25 MCG tablet REORDER    lactulose (CHRONULAC) 10 GM/15ML solution REORDER    Lactose POWD REORDER     Patient Instructions   Get your labs checked in [address]. Patient given educational handouts and has had all questions answered. Patient voices understanding and agrees to plans along with risks and benefits of plan. Patient isinstructed to continue prior meds, diet, and exercise plans unless instructed otherwise. Patient agrees to follow up as instructed and sooner if needed. Patient agrees to go to ER if condition becomes emergent. Notesmay be completed with dictation device and spelling errors may occur. Tatyana Fuller am scribing for and in the presence of Dr. Darian Neil.  2/13/2020   IDr. Dre Bhatt, the medical provider for the encounter with patient on 2/23/2020 at 6:34 PM have reviewed my scribe's documentation in earnest and take sole ownership of the intellectual property represented in documentation by my medical scribe and myself within this document. Some errors may occur in proofreading. Return in about 6 months (around 8/13/2020) for medicare AWV.

## 2020-02-15 LAB — TSH, 3RD GENERATION: 1.69 MU/L (ref 0.3–4)

## 2020-02-20 ENCOUNTER — TELEPHONE (OUTPATIENT)
Dept: PRIMARY CARE CLINIC | Age: 30
End: 2020-02-20

## 2020-02-20 NOTE — LETTER
[address] [facility]  Phone: [phone]  Fax: [phone]        February 20, 2020    [address] [facility] [address]      Dear Flavio Go: Your provider has reviewed the results and at this time based on the results your current labs are considered within normal limits. There is no need to change your current treatment regimen.   If you have any further questions please contact our office at [phone]           Thank you         Edwin Zhao MA

## 2020-02-20 NOTE — TELEPHONE ENCOUNTER
----- Message from Adelso Riley MD sent at 2/20/2020  6:50 AM CST -----  Please send letter to pt to let them know everything looked fine and no changes to be made at this time.

## 2020-04-20 RX ORDER — NORETHINDRONE AND ETHINYL ESTRADIOL 1 MG-35MCG
KIT ORAL
Qty: 28 TABLET | Refills: 0 | Status: SHIPPED | OUTPATIENT
Start: 2020-04-20 | End: 2020-06-04

## 2020-08-27 ENCOUNTER — VIRTUAL VISIT (OUTPATIENT)
Dept: PRIMARY CARE CLINIC | Age: 30
End: 2020-08-27
Payer: MEDICARE

## 2020-08-27 PROCEDURE — G0439 PPPS, SUBSEQ VISIT: HCPCS | Performed by: NURSE PRACTITIONER

## 2020-08-27 RX ORDER — NORETHINDRONE AND ETHINYL ESTRADIOL 1 MG-35MCG
KIT ORAL
Qty: 28 TABLET | Refills: 2 | Status: SHIPPED | OUTPATIENT
Start: 2020-08-27 | End: 2020-11-15

## 2020-08-27 RX ORDER — LEVOTHYROXINE SODIUM 0.03 MG/1
TABLET ORAL
Qty: 90 TABLET | Refills: 0 | Status: SHIPPED | OUTPATIENT
Start: 2020-08-27 | End: 2020-11-15

## 2020-08-27 ASSESSMENT — PATIENT HEALTH QUESTIONNAIRE - PHQ9
SUM OF ALL RESPONSES TO PHQ QUESTIONS 1-9: 0
SUM OF ALL RESPONSES TO PHQ QUESTIONS 1-9: 0

## 2020-08-27 NOTE — PROGRESS NOTES
regularly involved in providing care):   Patient Care Team:  Yessi Farooq MD as PCP - General (Family Medicine)  Yessi Farooq MD as PCP - [facility]    Wt Readings from Last 3 Encounters:   02/13/20 113 lb (51.3 kg)   04/12/19 122 lb (55.3 kg)   03/12/19 122 lb (55.3 kg)      No flowsheet data found. There is no height or weight on file to calculate BMI. Based upon direct observation of the patient, evaluation of cognition reveals autism. Guardian reports the patient goes to [employer]. Patient's complete Health Risk Assessment and screening values have been reviewed and are found in Flowsheets. The following problems were reviewed today and where indicated follow up appointments were made and/or referrals ordered. Positive Risk Factor Screenings with Interventions:     General Health:  General  In general, how would you say your health is?: Very Good  In the past 7 days, have you experienced any of the following? New or Increased Pain, New or Increased Fatigue, Loneliness, Social Isolation, Stress or Anger?: None of These  Do you get the social and emotional support that you need?: Yes  Do you have a Living Will?: (!) No  General Health Risk Interventions:  · No Living Will: patient declined and guardian declined    Health Habits/Nutrition:  Health Habits/Nutrition  Do you exercise for at least 20 minutes 2-3 times per week?: (!) No  Have you lost any weight without trying in the past 3 months?: No  Do you eat fewer than 2 meals per day?: No  Have you seen a dentist within the past year?: (!) No  There is no height or weight on file to calculate BMI.   Health Habits/Nutrition Interventions:  · Inadequate physical activity:  patient agrees to increase physical activity as follows: more walking throughout the day  · Dental exam overdue:  patient encouraged to make appointment with his/her dentist    Hearing/Vision:  No exam data present  Hearing/Vision  Do you or your family notice any trouble with your hearing?: No  Do you have difficulty driving, watching TV, or doing any of your daily activities because of your eyesight?: No  Have you had an eye exam within the past year?: (!) No  Hearing/Vision Interventions:  · Vision concerns:  patient encouraged to make appointment with his/her eye specialist, last exam 2 years ago    ADL:  ADLs  In the past 7 days, did you need help from others to perform any of the following everyday activities? Eating, dressing, grooming, bathing, toileting, or walking/balance?: (!) Walking/Balance, Bathing  In the past 7 days, did you need help from others to take care of any of the following?  Laundry, housekeeping, banking/finances, shopping, telephone use, food preparation, transportation, or taking medications?: [employer], Housekeeping, Banking/Finances, Shopping, Telephone Use, Food Preparation, Transportation, Taking Medications  ADL Interventions:  · Patient declines any further evaluation/treatment for this issue  · guardian helps with many of the patients ADLs due to autism    Personalized Preventive Plan   Current Health Maintenance Status  Immunization History   Administered Date(s) Administered    Influenza Virus Vaccine 12/14/2015    Influenza, Avtar Colon, IM, PF (6 mo and older Fluzone, Flulaval, Fluarix, and 3 yrs and older Afluria) 10/12/2016, 11/19/2018, 11/11/2019    Tdap (Boostrix, Adacel) 04/10/2017        Health Maintenance   Topic Date Due    Varicella vaccine (1 of 2 - 2-dose childhood series) 02/08/1992    HIV screen  02/08/2006    Cervical cancer screen  02/08/2012    Annual Wellness Visit (AWV)  05/29/2019    Flu vaccine (1) 09/01/2020    TSH testing  02/13/2021    DTaP/Tdap/Td vaccine (2 - Td) 04/10/2027    Hepatitis A vaccine  Aged Out    Hepatitis B vaccine  Aged Out    Hib vaccine  Aged Out    Meningococcal (ACWY) vaccine  Aged Out    Pneumococcal 0-64 years Vaccine  Aged Out     Recommendations for GTx Due: see orders and patient instructions/AVS.  . Recommended screening schedule for the next 5-10 years is provided to the patient in written form: see Patient Instructions/AVS.    Diagnoses and all orders for this visit:    Routine general medical examination at a health care facility    Impaired glucose tolerance  -     levothyroxine (SYNTHROID) 25 MCG tablet; TAKE 1 TABLET BY MOUTH ONCE DAILY  -     norethindrone-ethinyl estradiol (NORTREL 1/35, 28,) 1-35 MG-MCG per tablet; Take 1 tablet by mouth once daily. Mixed hyperlipidemia  -     levothyroxine (SYNTHROID) 25 MCG tablet; TAKE 1 TABLET BY MOUTH ONCE DAILY  -     norethindrone-ethinyl estradiol (NORTREL 1/35, 28,) 1-35 MG-MCG per tablet; Take 1 tablet by mouth once daily. Congenital hypothyroidism without goiter  -     levothyroxine (SYNTHROID) 25 MCG tablet; TAKE 1 TABLET BY MOUTH ONCE DAILY  -     norethindrone-ethinyl estradiol (NORTREL 1/35, 28,) 1-35 MG-MCG per tablet; Take 1 tablet by mouth once daily. Carmelina Elaine is a 35 y.o. female being evaluated by a Virtual Visit (phone) encounter to address concerns as mentioned above. A caregiver was present when appropriate. Due to this being a TeleHealth encounter (During JAUCS-50 public health emergency), evaluation of the following organ systems was limited: Vitals/Constitutional/EENT/Resp/CV/GI//MS/Neuro/Skin/Heme-Lymph-Imm. Pursuant to the emergency declaration under the [address] [place] [address], [address] [place] [address] authority and the [employer] Act, this Virtual Visit was conducted with patient's (and/or legal guardian's) consent, to reduce the patient's risk of exposure to COVID-19 and provide necessary medical care. The patient (and/or legal guardian) has also been advised to contact this office for worsening conditions or problems, and seek emergency medical treatment and/or call 911 if deemed necessary. Patient identification was verified at the start of the visit: Yes    Services were provided through phone to substitute for in-person clinic visit. Patient and provider were located at their individual homes. --ASAD Carcamo NP on 8/27/2020 at 9:26 AM    An electronic signature was used to authenticate this note.

## 2020-08-27 NOTE — PATIENT INSTRUCTIONS
Personalized Preventive Plan for Elmo Ferguson - 8/27/2020  Medicare offers a range of preventive health benefits. Some of the tests and screenings are paid in full while other may be subject to a deductible, co-insurance, and/or copay. Some of these benefits include a comprehensive review of your medical history including lifestyle, illnesses that may run in your family, and various assessments and screenings as appropriate. After reviewing your medical record and screening and assessments performed today your provider may have ordered immunizations, labs, imaging, and/or referrals for you. A list of these orders (if applicable) as well as your Preventive Care list are included within your After Visit Summary for your review. Other Preventive Recommendations:    · A preventive eye exam performed by an eye specialist is recommended every 1-2 years to screen for glaucoma; cataracts, macular degeneration, and other eye disorders. · A preventive dental visit is recommended every 6 months. · Try to get at least 150 minutes of exercise per week or 10,000 steps per day on a pedometer . · Order or download the FREE \"Exercise & Physical Activity: Your Everyday Guide\" from [employer]. Call 7-424.146.7569 or search [employer] online. · You need 9842-3460 mg of calcium and 2483-2118 IU of vitamin D per day. It is possible to meet your calcium requirement with diet alone, but a vitamin D supplement is usually necessary to meet this goal.  · When exposed to the sun, use a sunscreen that protects against both UVA and UVB radiation with an SPF of 30 or greater. Reapply every 2 to 3 hours or after sweating, drying off with a towel, or swimming. · Always wear a seat belt when traveling in a car. Always wear a helmet when riding a bicycle or motorcycle.

## 2021-03-11 ENCOUNTER — VIRTUAL VISIT (OUTPATIENT)
Dept: PRIMARY CARE CLINIC | Age: 31
End: 2021-03-11
Payer: MEDICARE

## 2021-03-11 DIAGNOSIS — R73.02 IMPAIRED GLUCOSE TOLERANCE: ICD-10-CM

## 2021-03-11 DIAGNOSIS — E78.2 MIXED HYPERLIPIDEMIA: ICD-10-CM

## 2021-03-11 DIAGNOSIS — E03.1 CONGENITAL HYPOTHYROIDISM WITHOUT GOITER: Primary | ICD-10-CM

## 2021-03-11 PROBLEM — F84.0 AUTISM DISORDER: Chronic | Status: ACTIVE | Noted: 2017-04-10

## 2021-03-11 PROCEDURE — 1036F TOBACCO NON-USER: CPT | Performed by: FAMILY MEDICINE

## 2021-03-11 PROCEDURE — G8428 CUR MEDS NOT DOCUMENT: HCPCS | Performed by: FAMILY MEDICINE

## 2021-03-11 PROCEDURE — G8484 FLU IMMUNIZE NO ADMIN: HCPCS | Performed by: FAMILY MEDICINE

## 2021-03-11 PROCEDURE — G8421 BMI NOT CALCULATED: HCPCS | Performed by: FAMILY MEDICINE

## 2021-03-11 PROCEDURE — 99213 OFFICE O/P EST LOW 20 MIN: CPT | Performed by: FAMILY MEDICINE

## 2021-03-11 RX ORDER — LEVOTHYROXINE SODIUM 0.03 MG/1
25 TABLET ORAL DAILY
Qty: 90 TABLET | Refills: 3 | Status: SHIPPED | OUTPATIENT
Start: 2021-03-11 | End: 2022-03-21

## 2021-03-11 RX ORDER — NORETHINDRONE AND ETHINYL ESTRADIOL 1 MG-35MCG
KIT ORAL
Qty: 28 TABLET | Refills: 11 | Status: SHIPPED | OUTPATIENT
Start: 2021-03-11 | End: 2022-03-15

## 2021-03-11 NOTE — PROGRESS NOTES
Tobacco Use    Smoking status: Never Smoker    Smokeless tobacco: Never Used   Substance Use Topics    Alcohol use: No     Alcohol/week: 0.0 standard drinks    Drug use: No   ,   Family History   Problem Relation Age of Onset    Cancer Father     Cancer Maternal Grandfather         ?   ,   Immunization History   Administered Date(s) Administered    Influenza Virus Vaccine 12/14/2015, 11/08/2020    Influenza, Kavya Garcianing, IM, PF (6 mo and older Fluzone, Flulaval, Fluarix, and 3 yrs and older Afluria) 10/12/2016, 11/19/2018, 11/11/2019    Tdap (Boostrix, Adacel) 04/10/2017   ,   Health Maintenance   Topic Date Due    Hepatitis C screen  Never done    Varicella vaccine (1 of 2 - 2-dose childhood series) Never done    HIV screen  Never done    Cervical cancer screen  Never done    TSH testing  02/13/2021    Annual Wellness Visit (AWV)  08/28/2021    DTaP/Tdap/Td vaccine (2 - Td) 04/10/2027    Flu vaccine  Completed    Hepatitis A vaccine  Aged Out    Hepatitis B vaccine  Aged Out    Hib vaccine  Aged Out    Meningococcal (ACWY) vaccine  Aged Out    Pneumococcal 0-64 years Vaccine  Aged Out       PHYSICAL EXAMINATION:  [ INSTRUCTIONS:  \"[x]\" Indicates a positive item  \"[]\" Indicates a negative item  -- DELETE ALL ITEMS NOT EXAMINED]  Vital Signs: (As obtained by patient/caregiver or practitioner observation)    Blood pressure-  Heart rate-    Respiratory rate-    Temperature-  Pulse oximetry-     Constitutional: [x] Appears well-developed and well-nourished [] No apparent distress      [] Abnormal-   Mental status  [x] Alert and awake  [x] Oriented to person/place/time [x]Able to follow commands      Eyes:  EOM    [x]  Normal  [] Abnormal-  Sclera  [x]  Normal  [] Abnormal -         Discharge []  None visible  [] Abnormal -    HENT:   [x] Normocephalic, atraumatic.   [] Abnormal   [x] Mouth/Throat: Mucous membranes are moist.     External Ears [x] Normal  [] Abnormal-     Neck: [x] No visualized mass Pulmonary/Chest: [x] Respiratory effort normal.  [x] No visualized signs of difficulty breathing or respiratory distress        [] Abnormal-      Musculoskeletal:   [x] Normal gait with no signs of ataxia         [x] Normal range of motion of neck        [] Abnormal-       Neurological:        [x] No Facial Asymmetry (Cranial nerve 7 motor function) (limited exam to video visit)          [x] No gaze palsy        [] Abnormal-         Skin:        [x] No significant exanthematous lesions or discoloration noted on facial skin         [] Abnormal-            Psychiatric:       [x] Normal Affect [x] No Hallucinations        [] Abnormal-     Other pertinent observable physical exam findings-     ASSESSMENT/PLAN:    ICD-10-CM    1. Congenital hypothyroidism without goiter  E03.1 norethindrone-ethinyl estradiol ([facility] 1/35, 28,) 1-35 MG-MCG per tablet     levothyroxine (SYNTHROID) 25 MCG tablet     TSH without Reflex     CBC Auto Differential   2. Impaired glucose tolerance  R73.02 norethindrone-ethinyl estradiol (NORTREL 1/35, 28,) 1-35 MG-MCG per tablet     levothyroxine (SYNTHROID) 25 MCG tablet     Comprehensive Metabolic Panel     CBC Auto Differential   3. Mixed hyperlipidemia  E78.2 norethindrone-ethinyl estradiol (NORTREL 1/35, 28,) 1-35 MG-MCG per tablet     levothyroxine (SYNTHROID) 25 MCG tablet       rec covid shot. Patient received approximately 5 minutes of counseling on COVID-19 pandemic in regards to hygiene, symptoms, following public guidelines, and precautions to take. Patient received additional 5 minutes of counseling on covid vaccination data and need to vaccinate when available.         Orders Placed This Encounter   Medications    norethindrone-ethinyl estradiol (NORTREL 1/35, 28,) 1-35 MG-MCG per tablet     Sig: Take 1 tab daily     Dispense:  28 tablet     Refill:  11    levothyroxine (SYNTHROID) 25 MCG tablet     Sig: Take 1 tablet by mouth Daily     Dispense:  90 tablet     Refill:  3 Orders Placed This Encounter   Procedures    Comprehensive Metabolic Panel     Standing Status:   Future     Standing Expiration Date:   3/11/2022    TSH without Reflex     Standing Status:   Future     Standing Expiration Date:   3/11/2022    CBC Auto Differential     Standing Status:   Future     Standing Expiration Date:   3/11/2022       Return in about 6 months (around 9/11/2021) for OV (M-Wam), medicare AWV 2 time slots. Eliazar Izaguirre is a 28 y.o. female being evaluated by a Virtual Visit (video visit) encounter to address concerns as mentioned above. A caregiver was present when appropriate. Due to this being a TeleHealth encounter (During PFVKR-26 public health emergency), evaluation of the following organ systems was limited: Vitals/Constitutional/EENT/Resp/CV/GI//MS/Neuro/Skin/Heme-Lymph-Imm. Pursuant to the emergency declaration under the [address] [place] [address], [address] [place] [address] authority and the [employer] Act, this Virtual Visit was conducted with patient's (and/or legal guardian's) consent, to reduce the patient's risk of exposure to COVID-19 and provide necessary medical care. The patient (and/or legal guardian) has also been advised to contact this office for worsening conditions or problems, and seek emergency medical treatment and/or call 911 if deemed necessary. Services were provided through a video synchronous discussion virtually to substitute for in-person clinic visit. Patient and provider were located at their individual homes or provider at secure site for business. It is possible that material may be posted from a notepad that is used for a Dragon dictation device for dictating notes outside the EMR and I am the original author of all of this content. --Becky Herrera MD on 3/11/2021 at 10:19 AM    An electronic signature was used to authenticate this note.

## 2021-04-12 ENCOUNTER — IMMUNIZATION (OUTPATIENT)
Age: 31
End: 2021-04-12
Payer: MEDICARE

## 2021-04-12 PROCEDURE — 0001A PR IMM ADMN SARSCOV2 30MCG/0.3ML DIL RECON 1ST DOSE: CPT | Performed by: FAMILY MEDICINE

## 2021-04-12 PROCEDURE — 91300 COVID-19, PFIZER VACCINE 30MCG/0.3ML DOSE: CPT | Performed by: FAMILY MEDICINE

## 2021-05-03 ENCOUNTER — IMMUNIZATION (OUTPATIENT)
Age: 31
End: 2021-05-03
Payer: MEDICARE

## 2021-05-03 PROCEDURE — 0002A PR IMM ADMN SARSCOV2 30MCG/0.3ML DIL RECON 2ND DOSE: CPT | Performed by: FAMILY MEDICINE

## 2021-05-03 PROCEDURE — 91300 COVID-19, PFIZER VACCINE 30MCG/0.3ML DOSE: CPT | Performed by: FAMILY MEDICINE

## 2021-09-07 ENCOUNTER — OFFICE VISIT (OUTPATIENT)
Dept: PRIMARY CARE CLINIC | Age: 31
End: 2021-09-07
Payer: MEDICARE

## 2021-09-07 VITALS
BODY MASS INDEX: 20.02 KG/M2 | HEART RATE: 103 BPM | SYSTOLIC BLOOD PRESSURE: 130 MMHG | TEMPERATURE: 98.6 F | WEIGHT: 113 LBS | HEIGHT: 63 IN | OXYGEN SATURATION: 99 % | DIASTOLIC BLOOD PRESSURE: 84 MMHG

## 2021-09-07 DIAGNOSIS — F84.0 AUTISM DISORDER: ICD-10-CM

## 2021-09-07 DIAGNOSIS — E03.1 CONGENITAL HYPOTHYROIDISM WITHOUT GOITER: ICD-10-CM

## 2021-09-07 DIAGNOSIS — Z00.00 ROUTINE GENERAL MEDICAL EXAMINATION AT A HEALTH CARE FACILITY: Primary | ICD-10-CM

## 2021-09-07 DIAGNOSIS — E78.2 MIXED HYPERLIPIDEMIA: ICD-10-CM

## 2021-09-07 PROCEDURE — 1036F TOBACCO NON-USER: CPT | Performed by: FAMILY MEDICINE

## 2021-09-07 PROCEDURE — G0439 PPPS, SUBSEQ VISIT: HCPCS | Performed by: FAMILY MEDICINE

## 2021-09-07 PROCEDURE — G8427 DOCREV CUR MEDS BY ELIG CLIN: HCPCS | Performed by: FAMILY MEDICINE

## 2021-09-07 PROCEDURE — G0008 ADMIN INFLUENZA VIRUS VAC: HCPCS | Performed by: FAMILY MEDICINE

## 2021-09-07 PROCEDURE — G8420 CALC BMI NORM PARAMETERS: HCPCS | Performed by: FAMILY MEDICINE

## 2021-09-07 PROCEDURE — 90674 CCIIV4 VAC NO PRSV 0.5 ML IM: CPT | Performed by: FAMILY MEDICINE

## 2021-09-07 PROCEDURE — 99213 OFFICE O/P EST LOW 20 MIN: CPT | Performed by: FAMILY MEDICINE

## 2021-09-07 ASSESSMENT — PATIENT HEALTH QUESTIONNAIRE - PHQ9
SUM OF ALL RESPONSES TO PHQ QUESTIONS 1-9: 1
SUM OF ALL RESPONSES TO PHQ9 QUESTIONS 1 & 2: 1
2. FEELING DOWN, DEPRESSED OR HOPELESS: 1
SUM OF ALL RESPONSES TO PHQ QUESTIONS 1-9: 1
SUM OF ALL RESPONSES TO PHQ QUESTIONS 1-9: 1
1. LITTLE INTEREST OR PLEASURE IN DOING THINGS: 0

## 2021-09-07 ASSESSMENT — LIFESTYLE VARIABLES: HOW OFTEN DO YOU HAVE A DRINK CONTAINING ALCOHOL: 0

## 2021-09-07 NOTE — PATIENT INSTRUCTIONS
Personalized Preventive Plan for Keshav Fernandez - 9/7/2021  Medicare offers a range of preventive health benefits. Some of the tests and screenings are paid in full while other may be subject to a deductible, co-insurance, and/or copay. Some of these benefits include a comprehensive review of your medical history including lifestyle, illnesses that may run in your family, and various assessments and screenings as appropriate. After reviewing your medical record and screening and assessments performed today your provider may have ordered immunizations, labs, imaging, and/or referrals for you. A list of these orders (if applicable) as well as your Preventive Care list are included within your After Visit Summary for your review. Other Preventive Recommendations:    · A preventive eye exam performed by an eye specialist is recommended every 1-2 years to screen for glaucoma; cataracts, macular degeneration, and other eye disorders. · A preventive dental visit is recommended every 6 months. · Try to get at least 150 minutes of exercise per week or 10,000 steps per day on a pedometer . · Order or download the FREE \"Exercise & Physical Activity: Your Everyday Guide\" from [employer]. Call 5-401.292.5117 or search [employer] online. · You need 7032-7400 mg of calcium and 2577-3617 IU of vitamin D per day. It is possible to meet your calcium requirement with diet alone, but a vitamin D supplement is usually necessary to meet this goal.  · When exposed to the sun, use a sunscreen that protects against both UVA and UVB radiation with an SPF of 30 or greater. Reapply every 2 to 3 hours or after sweating, drying off with a towel, or swimming. · Always wear a seat belt when traveling in a car. Always wear a helmet when riding a bicycle or motorcycle.

## 2021-09-07 NOTE — PROGRESS NOTES
Roni Ralph is a 28 y.o. female who presents today for   Chief Complaint   Patient presents with    Medicare AWV       HPI  Patient is here for annual wellness visit as well as follow-up for chronic disease. Patient has been out of school for close to 1.5 years. This has been disruptive for her. She has been more easily frustrated. She does not have many outlets for her frustration and she has been biting her right hand as of such. Her guardian is concerned about some depression but they want to hold off on treatment at this time. The patient cannot elaborate well on her emotions. She has received the COBIT vaccine. She remains under treatment of congenital hypothyroidism. No change in PMH, family, social, or surgical history unless mentioned above. I have reviewed the above chief complaint and HPI details charted by staff and claim ownership of the documentation. Review of Systems   Unable to perform ROS: Psychiatric disorder       Past Medical History:   Diagnosis Date    History of underactive thyroid        Current Outpatient Medications   Medication Sig Dispense Refill    BIOTIN PO Take by mouth daily      norethindrone-ethinyl estradiol ([facility] 1/35, 28,) 1-35 MG-MCG per tablet Take 1 tab daily 28 tablet 11    levothyroxine (SYNTHROID) 25 MCG tablet Take 1 tablet by mouth Daily 90 tablet 3    Lactose POWD Take 20 mg  Every two hours for 2 days until produced a good movement 1 g 0    Docusate Calcium (STOOL SOFTENER PO) Take by mouth      calcium carbonate (OSCAL) 500 MG TABS tablet Take 500 mg by mouth daily      FIBER FORMULA PO Take by mouth      polyethylene glycol (GLYCOLAX) powder Take 17 g by mouth daily      lactulose (CHRONULAC) 10 GM/15ML solution Take 30 mL every 2 hrs up 3 times in 24 hrs until large BM produced (Patient not taking: Reported on 9/7/2021) 270 mL 0     No current facility-administered medications for this visit.        Allergies   Allergen Reactions    Latex  Betadine [Povidone Iodine]     Sulfa Antibiotics        Past Surgical History:   Procedure Laterality Date    BREAST BIOPSY      Seveal years ago    OTHER SURGICAL HISTORY  7/8/15    fecal disempaction       Social History     Tobacco Use    Smoking status: Never Smoker    Smokeless tobacco: Never Used   Substance Use Topics    Alcohol use: No     Alcohol/week: 0.0 standard drinks    Drug use: No       Family History   Problem Relation Age of Onset    Cancer Father     Cancer Maternal Grandfather         ? /84 (Site: Left Upper Arm, Position: Sitting)   Pulse 103   Temp 98.6 °F (37 °C)   Ht 5' 3\" (1.6 m)   Wt 113 lb (51.3 kg)   SpO2 99%   BMI 20.02 kg/m²     Physical Exam  Vitals and nursing note reviewed. Constitutional:       General: She is not in acute distress. Appearance: She is well-developed. She is not toxic-appearing or diaphoretic. HENT:      Head: Normocephalic and atraumatic. Not macrocephalic and not microcephalic. Right Ear: External ear normal. No decreased hearing noted. No drainage. Left Ear: External ear normal. No decreased hearing noted. No drainage. Nose: Nose normal. No nasal deformity or rhinorrhea. Mouth/Throat:      Mouth: Mucous membranes are not pale and not dry. Pharynx: Uvula midline. Eyes:      General: Lids are normal. No scleral icterus. Right eye: No discharge. Left eye: No discharge. Conjunctiva/sclera: Conjunctivae normal.      Pupils: Pupils are equal, round, and reactive to light. Neck:      Thyroid: No thyromegaly. Trachea: Phonation normal. No tracheal deviation. Cardiovascular:      Rate and Rhythm: Normal rate and regular rhythm. No extrasystoles are present. Heart sounds: Normal heart sounds, S1 normal and S2 normal. No murmur heard. Pulmonary:      Effort: Pulmonary effort is normal. No respiratory distress. Breath sounds: Normal breath sounds.  No wheezing, rhonchi or rales. Abdominal:      General: Bowel sounds are normal. There is no distension. Palpations: Abdomen is soft. Tenderness: There is no abdominal tenderness. There is no guarding. Musculoskeletal:         General: No tenderness. Normal range of motion. Cervical back: Normal range of motion and neck supple. No tenderness or bony tenderness. Thoracic back: Normal. No tenderness or bony tenderness. Lumbar back: Normal. No tenderness or bony tenderness. Right lower leg: No swelling. No edema. Left lower leg: No swelling. No edema. Lymphadenopathy:      Head:      Right side of head: No submental, submandibular or tonsillar adenopathy. Left side of head: No submental, submandibular or tonsillar adenopathy. Cervical: No cervical adenopathy. Upper Body:      Right upper body: No supraclavicular adenopathy. Left upper body: No supraclavicular adenopathy. Skin:     General: Skin is dry. Coloration: Skin is not pale. Findings: No erythema (on head, neck, face, extremities) or rash (on extremities, head, neck, face). Nails: There is no clubbing. Neurological:      Mental Status: She is alert and oriented to person, place, and time. Motor: No tremor or seizure activity. Gait: Gait normal.      Deep Tendon Reflexes: Reflexes are normal and symmetric. Psychiatric:         Mood and Affect: Mood is anxious. Speech: Speech is delayed. Behavior: Behavior is withdrawn. Thought Content: Thought content normal.         Judgment: Judgment is impulsive. Assessment:    ICD-10-CM    1. Routine general medical examination at a health care facility  Z00.00    2. Congenital hypothyroidism without goiter  E03.1 TSH without Reflex     Lipid Panel     Comprehensive Metabolic Panel   3. Mixed hyperlipidemia  E78.2 Lipid Panel   4.  Autism disorder  F84.0        Plan:     Patient needs laboratory follow-up for high risk

## 2021-09-07 NOTE — PROGRESS NOTES
Medicare Annual Wellness Visit  Name: Vanessa Sanchez Date: 2021   MRN: [id number] Sex: Female   Age: 28 y.o. Ethnicity: Non- / Non    : 1990 Race: White (non-)      Lesia Dobson is here for Medicare AWV    Screenings for behavioral, psychosocial and functional/safety risks, and cognitive dysfunction are all negative except as indicated below. These results, as well as other patient data from the [address] form, are documented in Flowsheets linked to this Encounter. Allergies   Allergen Reactions    Latex     Betadine [Povidone Iodine]     Sulfa Antibiotics          Prior to Visit Medications    Medication Sig Taking? Authorizing Provider   BIOTIN PO Take by mouth daily Yes Historical Provider, MD   norethindrone-ethinyl estradiol (Amanda Enriquez ,) 1-35 MG-MCG per tablet Take 1 tab daily Yes Manisha Glover MD   levothyroxine (SYNTHROID) 25 MCG tablet Take 1 tablet by mouth Daily Yes Manisha Glover MD   Lactose POWD Take 20 mg  Every two hours for 2 days until produced a good movement Yes Manisha Glover MD   Docusate Calcium (STOOL SOFTENER PO) Take by mouth Yes Historical Provider, MD   calcium carbonate (OSCAL) 500 MG TABS tablet Take 500 mg by mouth daily Yes Historical Provider, MD   FIBER FORMULA PO Take by mouth Yes Historical Provider, MD   polyethylene glycol (GLYCOLAX) powder Take 17 g by mouth daily Yes Historical Provider, MD   lactulose (CHRONULAC) 10 GM/15ML solution Take 30 mL every 2 hrs up 3 times in 24 hrs until large BM produced  Patient not taking: Reported on 2021  Manisha Glover MD         Past Medical History:   Diagnosis Date    History of underactive thyroid        Past Surgical History:   Procedure Laterality Date    BREAST BIOPSY      Seveal years ago    OTHER SURGICAL HISTORY  7/8/15    fecal disempaction         Family History   Problem Relation Age of Onset    Cancer Father     Cancer Maternal Grandfather         ? CareTeam (Including outside providers/suppliers regularly involved in providing care):   Patient Care Team:  Reggie Lantigua MD as PCP - General (Family Medicine)  Reggie Lantigua MD as PCP - [facility] Empaneled Provider    Wt Readings from Last 3 Encounters:   09/07/21 113 lb (51.3 kg)   02/13/20 113 lb (51.3 kg)   04/12/19 122 lb (55.3 kg)     Vitals:    09/07/21 1455   BP: 130/84   Site: Left Upper Arm   Position: Sitting   Pulse: 103   Temp: 98.6 °F (37 °C)   SpO2: 99%   Weight: 113 lb (51.3 kg)   Height: 5' 3\" (1.6 m)     Body mass index is 20.02 kg/m². Based upon direct observation of the patient, evaluation of cognition reveals recent and remote memory intact. Patient's complete Health Risk Assessment and screening values have been reviewed and are found in Flowsheets. The following problems were reviewed today and where indicated follow up appointments were made and/or referrals ordered. Positive Risk Factor Screenings with Interventions:            General Health and ACP:  General  In general, how would you say your health is?: Excellent  In the past 7 days, have you experienced any of the following?  New or Increased Pain, New or Increased Fatigue, Loneliness, Social Isolation, Stress or Anger?: (!) Loneliness, Social Isolation  Do you get the social and emotional support that you need?: Yes  Do you have a Living Will?: (!) No  Advance Directives     Power of [address] [place] [address] Will ACP-Advance Directive ACP-Power of     Not on File Not on File Not on File Filed      General Health Risk Interventions:  · Loneliness: patient declines any further intervention for this issue  · Social isolation: patient declines any further intervention for this issue  · No Living Will: Advance Care Planning addressed with patient today    Health Habits/Nutrition:  Health Habits/Nutrition  Do you exercise for at least 20 minutes 2-3 times per week?: (!) No  Have you lost any weight without trying in the past 3 months?: No  Do you eat only one meal per day?: No  Have you seen the dentist within the past year?: Appointment is scheduled  Body mass index: 20.01  Health Habits/Nutrition Interventions:  · Inadequate physical activity:  educational materials provided to promote increased physical activity     Safety:  Safety  Do you have working smoke detectors?: Yes  Have all throw rugs been removed or fastened?: (!) No  Do you have non-slip mats or surfaces in all bathtubs/showers?: Yes  Do all of your stairways have a railing or banister?:  (no stairs)  Are your doorways, halls and stairs free of clutter?: Yes  Do you always fasten your seatbelt when you are in a car?: Yes  Safety Interventions:  · Home safety tips provided    ADL:  ADLs  In the past 7 days, did you need help from others to perform any of the following everyday activities? Eating, dressing, grooming, bathing, toileting, or walking/balance?: (!) Bathing, Eating, Dressing, Grooming, Walking/Balance, Toileting  In the past 7 days, did you need help from others to take care of any of the following?  Laundry, housekeeping, banking/finances, shopping, telephone use, food preparation, transportation, or taking medications?: (!) Taking Medications, Transportation, Food Preparation, Banking/Finances, Housekeeping, Laundry  ADL Interventions:  · Patient declines any further evaluation/treatment for this issue   · Special needs has guardian to help    Personalized Preventive Plan   Current Health Maintenance Status  Immunization History   Administered Date(s) Administered    COVID-19, [place], PF, 30mcg/0.3mL 04/12/2021, 05/03/2021    Influenza Virus Vaccine 12/14/2015, 11/08/2020    Influenza, MDCK Quadv, IM, PF (Flucelvax 2 yrs and older) 09/07/2021    Influenza, Yanelis Hurt, IM, PF (6 mo and older Fluzone, Flulaval, Fluarix, and 3 yrs and older Afluria) 10/12/2016, 11/19/2018, 11/11/2019    Tdap (Boostrix, Adacel) 04/10/2017        Health Maintenance   Topic Date Due  Hepatitis C screen  Never done    Varicella vaccine (1 of 2 - 2-dose childhood series) Never done    HIV screen  Never done    Cervical cancer screen  Never done    TSH testing  02/13/2021    Annual Wellness Visit (AWV)  08/28/2021    DTaP/Tdap/Td vaccine (2 - Td or Tdap) 04/10/2027    Flu vaccine  Completed    COVID-19 Vaccine  Completed    Hepatitis A vaccine  Aged Out    Hepatitis B vaccine  Aged Out    Hib vaccine  Aged Out    Meningococcal (ACWY) vaccine  Aged Out    Pneumococcal 0-64 years Vaccine  Aged Out     Recommendations for [employer] Due: see orders and patient instructions/AVS.  . Recommended screening schedule for the next 5-10 years is provided to the patient in written form: see Patient Omar Chavarria was seen today for medicare awv. Diagnoses and all orders for this visit:    Routine general medical examination at a health care facility    Congenital hypothyroidism without goiter  -     TSH without Reflex; Future  -     Lipid Panel; Future  -     Comprehensive Metabolic Panel; Future    Mixed hyperlipidemia  -     Lipid Panel;  Future    Autism disorder    Other orders  -     INFLUENZA, MDCK QUADV, 2 YRS AND OLDER, IM, PF, PREFILL SYR OR SDV, 0.5ML (FLUCELVAX QUADV, PF)

## 2022-03-15 ENCOUNTER — OFFICE VISIT (OUTPATIENT)
Dept: PRIMARY CARE CLINIC | Age: 32
End: 2022-03-15
Payer: MEDICARE

## 2022-03-15 VITALS
HEIGHT: 63 IN | RESPIRATION RATE: 18 BRPM | HEART RATE: 93 BPM | SYSTOLIC BLOOD PRESSURE: 132 MMHG | TEMPERATURE: 97.8 F | DIASTOLIC BLOOD PRESSURE: 84 MMHG | OXYGEN SATURATION: 98 % | WEIGHT: 107 LBS | BODY MASS INDEX: 18.96 KG/M2

## 2022-03-15 DIAGNOSIS — F84.0 AUTISM DISORDER: ICD-10-CM

## 2022-03-15 DIAGNOSIS — M41.34 THORACOGENIC SCOLIOSIS OF THORACIC REGION: ICD-10-CM

## 2022-03-15 DIAGNOSIS — Z74.1 REQUIRES ASSISTANCE WITH ACTIVITIES OF DAILY LIVING (ADL): Primary | ICD-10-CM

## 2022-03-15 DIAGNOSIS — E03.1 CONGENITAL HYPOTHYROIDISM WITHOUT GOITER: ICD-10-CM

## 2022-03-15 DIAGNOSIS — K59.03 DRUG-INDUCED CONSTIPATION: ICD-10-CM

## 2022-03-15 PROCEDURE — G8427 DOCREV CUR MEDS BY ELIG CLIN: HCPCS | Performed by: FAMILY MEDICINE

## 2022-03-15 PROCEDURE — 1036F TOBACCO NON-USER: CPT | Performed by: FAMILY MEDICINE

## 2022-03-15 PROCEDURE — 99214 OFFICE O/P EST MOD 30 MIN: CPT | Performed by: FAMILY MEDICINE

## 2022-03-15 PROCEDURE — G8482 FLU IMMUNIZE ORDER/ADMIN: HCPCS | Performed by: FAMILY MEDICINE

## 2022-03-15 PROCEDURE — G8420 CALC BMI NORM PARAMETERS: HCPCS | Performed by: FAMILY MEDICINE

## 2022-03-15 RX ORDER — LACTULOSE 10 G/15ML
SOLUTION ORAL
Qty: 270 ML | Refills: 0 | Status: SHIPPED | OUTPATIENT
Start: 2022-03-15 | End: 2022-06-08 | Stop reason: SDUPTHER

## 2022-03-15 RX ORDER — MEDROXYPROGESTERONE ACETATE 150 MG/ML
150 INJECTION, SUSPENSION INTRAMUSCULAR
Qty: 1 ML | Refills: 3 | Status: SHIPPED | OUTPATIENT
Start: 2022-03-15 | End: 2022-06-08 | Stop reason: ALTCHOICE

## 2022-03-15 NOTE — PROGRESS NOTES
Otis Monae is a 33 y.o. female who presents today for   Chief Complaint   Patient presents with    6 Month Follow-Up     Pt's guardian says she has no concerns        HPI  Patient is here for f/u autism. She has had a slow down on stools, working on  training her, they have backed off on miralax. Her diet has improved veg/fruit. She is with a new guardian and doing quite well. She needs an order for gloves and wipes. Ongoing incontinence remains an issue. They are working on potty training. Has scoliosis. Tylenol as needed. No change in PMH, family, social, or surgical history unless mentioned above. I have reviewed the above chief complaint and HPI details charted by staff and claim ownership of the documentation. Review of Systems   Unable to perform ROS: Patient nonverbal       Past Medical History:   Diagnosis Date    History of underactive thyroid        Current Outpatient Medications   Medication Sig Dispense Refill    Misc. Devices MISC Wipes and gloves  Dx:  Fecal incontinence 120 each 11    lactulose (CHRONULAC) 10 GM/15ML solution Take 30 mL every 2 hrs up 3 times in 24 hrs until large BM produced 270 mL 0    medroxyPROGESTERone (DEPO-PROVERA) 150 MG/ML injection Inject 1 mL into the muscle every 3 months 1 mL 3    levothyroxine (SYNTHROID) 25 MCG tablet Take 1 tablet by mouth Daily 90 tablet 3    Docusate Calcium (STOOL SOFTENER PO) Take by mouth      calcium carbonate (OSCAL) 500 MG TABS tablet Take 500 mg by mouth daily      FIBER FORMULA PO Take by mouth      polyethylene glycol (GLYCOLAX) powder Take 17 g by mouth daily      BIOTIN PO Take by mouth daily (Patient not taking: Reported on 3/15/2022)      Lactose POWD Take 20 mg  Every two hours for 2 days until produced a good movement (Patient not taking: Reported on 3/15/2022) 1 g 0     No current facility-administered medications for this visit.        Allergies   Allergen Reactions    Latex     Betadine M41.34        Plan:   Overall doing well, transition well to a new guardian. Patient to have incontinence supplies and continue with potty training though. I do believe improve diet has been helping. Recommended continual Tylenol usage and potentially physical therapy if needed. Continue current guardianship. No orders of the defined types were placed in this encounter. Orders Placed This Encounter   Medications    Misc. Devices MISC     Sig: Wipes and gloves  Dx:  Fecal incontinence     Dispense:  120 each     Refill:  11    lactulose (CHRONULAC) 10 GM/15ML solution     Sig: Take 30 mL every 2 hrs up 3 times in 24 hrs until large BM produced     Dispense:  270 mL     Refill:  0    medroxyPROGESTERone (DEPO-PROVERA) 150 MG/ML injection     Sig: Inject 1 mL into the muscle every 3 months     Dispense:  1 mL     Refill:  3     Can add verbal order for needles and syringe if needed     Medications Discontinued During This Encounter   Medication Reason    lactulose (CHRONULAC) 10 GM/15ML solution REORDER    norethindrone-ethinyl estradiol (NORTREL 1/35, 28,) 1-35 MG-MCG per tablet LIST CLEANUP     Patient Instructions   Jc nogueira can be good for chiropractic care. Patient given educational handouts and has had all questions answered. Patient voices understanding and agrees to plans along with risks and benefits of plan. Patient isinstructed to continue prior meds, diet, and exercise plans unless instructed otherwise. Patient agrees to follow up as instructed and sooner if needed. Patient agrees to go to ER if condition becomes emergent. Notesmay be completed with dictation device and spelling errors may occur. Materials may be copied and pasted from a notepad outside of EMR, all of which, I, Dr. Christina Valdovinos MD, take sole intellectual ownership of and have approved adding to my note. Return in about 6 months (around 9/15/2022) for vv.

## 2022-03-21 DIAGNOSIS — E78.2 MIXED HYPERLIPIDEMIA: ICD-10-CM

## 2022-03-21 DIAGNOSIS — E03.1 CONGENITAL HYPOTHYROIDISM WITHOUT GOITER: ICD-10-CM

## 2022-03-21 DIAGNOSIS — R73.02 IMPAIRED GLUCOSE TOLERANCE: ICD-10-CM

## 2022-03-21 RX ORDER — LEVOTHYROXINE SODIUM 0.03 MG/1
TABLET ORAL
Qty: 90 TABLET | Refills: 0 | Status: SHIPPED | OUTPATIENT
Start: 2022-03-21 | End: 2022-04-01

## 2022-04-01 DIAGNOSIS — R73.02 IMPAIRED GLUCOSE TOLERANCE: ICD-10-CM

## 2022-04-01 DIAGNOSIS — E03.1 CONGENITAL HYPOTHYROIDISM WITHOUT GOITER: ICD-10-CM

## 2022-04-01 DIAGNOSIS — E78.2 MIXED HYPERLIPIDEMIA: ICD-10-CM

## 2022-04-01 RX ORDER — LEVOTHYROXINE SODIUM 0.03 MG/1
TABLET ORAL
Qty: 90 TABLET | Refills: 0 | Status: SHIPPED | OUTPATIENT
Start: 2022-04-01 | End: 2022-06-08 | Stop reason: SDUPTHER

## 2022-04-01 NOTE — TELEPHONE ENCOUNTER
Kirsty Eaton called to request a refill on her medication.       Last office visit : 3/15/2022   Next office visit : 9/12/2022     Requested Prescriptions     Signed Prescriptions Disp Refills    levothyroxine (SYNTHROID) 25 MCG tablet 90 tablet 0     Sig: TAKE 1 TABLET BY MOUTH ONCE DAILY     Authorizing Provider: Mariano Turner     Ordering User: Magnolia Poe MA

## 2022-04-29 ENCOUNTER — TELEPHONE (OUTPATIENT)
Dept: PRIMARY CARE CLINIC | Age: 32
End: 2022-04-29

## 2022-04-29 NOTE — TELEPHONE ENCOUNTER
----- Message from Jadon Rodrigez sent at 4/80/8865 11:11 AM CDT -----  Subject: Message to Provider    QUESTIONS  Information for Provider? Barby Maurice reached out to advised   that KEATON Caputo will be admitted to [facility] and they are   requesting an order for a list of medication [facility]) submitted faxed   [phone] Attn; Nolan Ruffin   ---------------------------------------------------------------------------  --------------  [address]  What is the best way for the office to contact you? OK to leave message on   voicemail  Preferred Call Back Phone Number? [phone]  ---------------------------------------------------------------------------  --------------  SCRIPT ANSWERS  Relationship to Patient? Guardian  Representative Name? Monik Maurice  Is the Representative on the appropriate HIPAA document in Epic?  Yes

## 2022-05-03 ENCOUNTER — TELEPHONE (OUTPATIENT)
Dept: PRIMARY CARE CLINIC | Age: 32
End: 2022-05-03

## 2022-05-03 NOTE — TELEPHONE ENCOUNTER
I had already faxed this so I called [facility] and verified their fax number. I then sent the fax while on the phone with Mary Payne. He said he will call me back to let me know if he receives it.

## 2022-05-03 NOTE — TELEPHONE ENCOUNTER
----- Message from Balbir Mcadams sent at 5/3/2022  1:31 PM CDT -----  Subject: Message to Provider    QUESTIONS  Information for Provider? Yamile Johnson needs to have faxed a list of her   medications to [facility] fax number is [phone] Attn; Shyanne Boo WITH HER NAME ON IT. please advise   ---------------------------------------------------------------------------  --------------  CALL BACK INFO  What is the best way for the office to contact you? OK to leave message on   voicemail  Preferred Call Back Phone Number? [phone]  ---------------------------------------------------------------------------  --------------  SCRIPT ANSWERS  Relationship to Patient?  Self

## 2022-06-08 ENCOUNTER — OFFICE VISIT (OUTPATIENT)
Dept: PRIMARY CARE CLINIC | Age: 32
End: 2022-06-08
Payer: MEDICARE

## 2022-06-08 VITALS
WEIGHT: 104.4 LBS | HEIGHT: 63 IN | DIASTOLIC BLOOD PRESSURE: 88 MMHG | TEMPERATURE: 98.4 F | BODY MASS INDEX: 18.5 KG/M2 | OXYGEN SATURATION: 99 % | SYSTOLIC BLOOD PRESSURE: 122 MMHG | HEART RATE: 86 BPM

## 2022-06-08 DIAGNOSIS — E78.2 MIXED HYPERLIPIDEMIA: ICD-10-CM

## 2022-06-08 DIAGNOSIS — Z74.1 REQUIRES ASSISTANCE WITH ACTIVITIES OF DAILY LIVING (ADL): ICD-10-CM

## 2022-06-08 DIAGNOSIS — G89.29 CHRONIC BILATERAL BACK PAIN, UNSPECIFIED BACK LOCATION: ICD-10-CM

## 2022-06-08 DIAGNOSIS — M54.9 CHRONIC BILATERAL BACK PAIN, UNSPECIFIED BACK LOCATION: ICD-10-CM

## 2022-06-08 DIAGNOSIS — Z00.00 WELL ADULT EXAM: Primary | ICD-10-CM

## 2022-06-08 DIAGNOSIS — R73.02 IMPAIRED GLUCOSE TOLERANCE: ICD-10-CM

## 2022-06-08 DIAGNOSIS — M41.34 THORACOGENIC SCOLIOSIS OF THORACIC REGION: ICD-10-CM

## 2022-06-08 DIAGNOSIS — K59.03 DRUG-INDUCED CONSTIPATION: ICD-10-CM

## 2022-06-08 DIAGNOSIS — E03.1 CONGENITAL HYPOTHYROIDISM WITHOUT GOITER: ICD-10-CM

## 2022-06-08 DIAGNOSIS — F84.0 AUTISM DISORDER: ICD-10-CM

## 2022-06-08 DIAGNOSIS — Z00.00 ROUTINE GENERAL MEDICAL EXAMINATION AT A HEALTH CARE FACILITY: ICD-10-CM

## 2022-06-08 PROCEDURE — 86580 TB INTRADERMAL TEST: CPT | Performed by: NURSE PRACTITIONER

## 2022-06-08 PROCEDURE — 99395 PREV VISIT EST AGE 18-39: CPT | Performed by: NURSE PRACTITIONER

## 2022-06-08 RX ORDER — ACETAMINOPHEN 500 MG
500 TABLET ORAL 4 TIMES DAILY PRN
Qty: 120 TABLET | Refills: 0 | Status: SHIPPED | OUTPATIENT
Start: 2022-06-08 | End: 2022-09-08 | Stop reason: ALTCHOICE

## 2022-06-08 RX ORDER — DOCUSATE SODIUM 100 MG/1
100 CAPSULE, LIQUID FILLED ORAL DAILY
Qty: 30 CAPSULE | Refills: 5 | Status: SHIPPED | OUTPATIENT
Start: 2022-06-08 | End: 2022-07-08

## 2022-06-08 RX ORDER — LEVOTHYROXINE SODIUM 0.03 MG/1
TABLET ORAL
Qty: 90 TABLET | Refills: 2 | Status: SHIPPED | OUTPATIENT
Start: 2022-06-08

## 2022-06-08 RX ORDER — MEDROXYPROGESTERONE ACETATE 150 MG/ML
150 INJECTION, SUSPENSION INTRAMUSCULAR
Qty: 1 ML | Refills: 5 | Status: SHIPPED | OUTPATIENT
Start: 2022-06-08 | End: 2022-09-08 | Stop reason: SDUPTHER

## 2022-06-08 RX ORDER — CYCLOBENZAPRINE HCL 10 MG
10 TABLET ORAL NIGHTLY
Qty: 30 TABLET | Refills: 1 | Status: SHIPPED | OUTPATIENT
Start: 2022-06-08 | End: 2022-07-19 | Stop reason: SDUPTHER

## 2022-06-08 RX ORDER — LACTULOSE 10 G/15ML
SOLUTION ORAL
Qty: 270 ML | Refills: 0 | Status: SHIPPED | OUTPATIENT
Start: 2022-06-08

## 2022-06-08 RX ORDER — POLYETHYLENE GLYCOL 3350 17 G/17G
17 POWDER, FOR SOLUTION ORAL DAILY PRN
Qty: 850 G | Refills: 2 | Status: SHIPPED | OUTPATIENT
Start: 2022-06-08 | End: 2022-07-08

## 2022-06-08 ASSESSMENT — PATIENT HEALTH QUESTIONNAIRE - PHQ9
1. LITTLE INTEREST OR PLEASURE IN DOING THINGS: 0
SUM OF ALL RESPONSES TO PHQ QUESTIONS 1-9: 0
SUM OF ALL RESPONSES TO PHQ9 QUESTIONS 1 & 2: 0
2. FEELING DOWN, DEPRESSED OR HOPELESS: 0

## 2022-06-08 NOTE — PROGRESS NOTES
[facility]  [address]  [address] [facility] [address]  Dept:[phone]  Dept Fax: [phone]  Loc: [phone]        Ivett Rizzo is a 33 y.o. female who presents today for her medical conditions/complaints as noted below. Ivett Rizzo is c/o Medication Check (pt needs a script for tylenol, fiber) and Follow-up (pt has a list of foods to eat from [facility], but she eats regular food.)        Chief Complaint   Patient presents with    Medication Check     pt needs a script for tylenol, fiber    Follow-up     pt has a list of foods to eat from [facility], but she eats regular food. HPI:     HPI    Well exam    Patient here with current guardian. She is being transferred to new guardian and will be in a facility that dispenses medications to patient. Current care provider is the daughter-in-law all of her previous care provider who passed away. 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 She does not participate in conversations occurring about her care. Patient does not look at provider during exam or history taking. New care provider request all scripts be printed so she may take these to the facility. Patient reports that they have been compliant with taking medicationsas directed.      Past Medical History:   Diagnosis Date    History of underactive thyroid        Past Surgical History:   Procedure Laterality Date    BREAST BIOPSY      Seveal years ago    OTHER SURGICAL HISTORY  7/8/15    fecal disempaction       Social History     Socioeconomic History    Marital status: Single     Spouse name: None    Number of children: None    Years of education: None    Highest education level: None   Occupational History    None   Tobacco Use    Smoking status: Never Smoker    Smokeless tobacco: Never Used   Substance and Sexual Activity    Alcohol use: No     Alcohol/week: 0.0 standard drinks    Drug use: No    Sexual activity: Never   Other Topics Concern    None   Social History Narrative    None     Social Determinants of Health     Financial Resource Strain:     Difficulty of Paying Living Expenses: Not on file   Food Insecurity:     Worried About Running Out of Food in the Last Year: Not on file    Himanshu of Food in the Last Year: Not on file   Transportation Needs:     Lack of Transportation (Medical): Not on file    Lack of Transportation (Non-Medical): Not on file   Physical Activity:     Days of Exercise per Week: Not on file    Minutes of Exercise per Session: Not on file   Stress:     Feeling of Stress : Not on file   Social Connections:     Frequency of Communication with Friends and Family: Not on file    Frequency of Social Gatherings with Friends and Family: Not on file    Attends [employer] Services: Not on file    Active Member of [address] [place] [address] or Organizations: Not on file    Attends Club or Organization Meetings: Not on file    Marital Status: Not on file   Intimate Partner Violence:     Fear of Current or Ex-Partner: Not on file    Emotionally Abused: Not on file    Physically Abused: Not on file    Sexually Abused: Not on file   Housing Stability:     Unable to Pay for Housing in the Last Year: Not on file    Number of [place] in the Last Year: Not on file    Unstable Housing in the Last Year: Not on file       Family History   Problem Relation Age of Onset    Cancer Father     Cancer Maternal Grandfather         ?        Current Outpatient Medications   Medication Sig Dispense Refill    docusate sodium (STOOL SOFTENER) 100 mg capsule Take 1 capsule by mouth daily 30 capsule 5    lactulose (CHRONULAC) 10 GM/15ML solution Take 30 mL every 2 hrs up 3 times in 24 hrs until large BM produced 270 mL 0    levothyroxine (SYNTHROID) 25 MCG tablet TAKE 1 TABLET BY MOUTH ONCE DAILY 90 tablet 2    polyethylene glycol (GLYCOLAX) 17 GM/SCOOP powder Take 17 g by mouth daily as needed (constipation) 850 g 2    acetaminophen (TYLENOL) 500 MG tablet Take 1 tablet by mouth 4 times daily as needed for Pain 120 tablet 0    cyclobenzaprine (FLEXERIL) 10 mg tablet Take 1 tablet by mouth nightly 30 tablet 1    medroxyPROGESTERone (DEPO-PROVERA) 150 MG/ML injection Inject 1 mL into the muscle every 3 months 1 mL 5    Misc. Devices MISC Wipes and gloves  Dx:  Fecal incontinence 120 each 11    BIOTIN PO Take by mouth daily (Patient not taking: Reported on 3/15/2022)      Lactose POWD Take 20 mg  Every two hours for 2 days until produced a good movement (Patient not taking: Reported on 3/15/2022) 1 g 0     No current facility-administered medications for this visit. Allergies   Allergen Reactions    Latex     Betadine [Povidone Iodine]     Sulfa Antibiotics        Lab Review not applicable    Subjective:   Review of Systems   Unable to perform ROS: Patient nonverbal   Musculoskeletal: Positive for back pain (per caregiver report). Objective:     Physical Exam  Vitals and nursing note reviewed. Constitutional:       General: She is not in acute distress. Appearance: She is well-developed. She is not toxic-appearing or diaphoretic. HENT:      Head: Normocephalic and atraumatic. Not macrocephalic and not microcephalic. Right Ear: External ear normal. No decreased hearing noted. No drainage. Left Ear: External ear normal. No decreased hearing noted. No drainage. Nose: Nose normal. No nasal deformity or rhinorrhea. Mouth/Throat:      Mouth: Mucous membranes are not pale and not dry. Pharynx: Uvula midline. Eyes:      General: Lids are normal. No scleral icterus. Right eye: No discharge. Left eye: No discharge. Conjunctiva/sclera: Conjunctivae normal.      Pupils: Pupils are equal, round, and reactive to light. Neck:      Thyroid: No thyromegaly. Trachea: Phonation normal. No tracheal deviation.    Cardiovascular:      Rate and Rhythm: Normal rate and regular rhythm. No extrasystoles are present. Heart sounds: Normal heart sounds, S1 normal and S2 normal. No murmur heard. Pulmonary:      Effort: Pulmonary effort is normal. No respiratory distress. Breath sounds: Normal breath sounds. No wheezing, rhonchi or rales. Chest:   Breasts:      Right: No supraclavicular adenopathy. Left: No supraclavicular adenopathy. Abdominal:      General: Bowel sounds are normal. There is no distension. Palpations: Abdomen is soft. Tenderness: There is no abdominal tenderness. There is no guarding. Musculoskeletal:         General: No tenderness. Normal range of motion. Cervical back: Normal range of motion and neck supple. No tenderness or bony tenderness. Thoracic back: Normal. No tenderness or bony tenderness. Lumbar back: Normal. No tenderness or bony tenderness. Right lower leg: No swelling. No edema. Left lower leg: No swelling. No edema. Lymphadenopathy:      Head:      Right side of head: No submental, submandibular or tonsillar adenopathy. Left side of head: No submental, submandibular or tonsillar adenopathy. Cervical: No cervical adenopathy. Upper Body:      Right upper body: No supraclavicular adenopathy. Left upper body: No supraclavicular adenopathy. Skin:     General: Skin is dry. Coloration: Skin is not pale. Findings: No erythema (on head, neck, face, extremities) or rash (on extremities, head, neck, face). Nails: There is no clubbing. Neurological:      Mental Status: She is alert and oriented to person, place, and time. Motor: No tremor or seizure activity. Gait: Gait normal.      Deep Tendon Reflexes: Reflexes are normal and symmetric. Psychiatric:         Mood and Affect: Mood is anxious. Speech: Speech is delayed. Behavior: Behavior is withdrawn. Thought Content:  Thought content normal.         Judgment: Judgment is impulsive. /88   Pulse 86   Temp 98.4 °F (36.9 °C) (Temporal)   Ht 5' 3\" (1.6 m)   Wt 104 lb 6.4 oz (47.4 kg)   SpO2 99%   BMI 18.49 kg/m²     Assessment:      Diagnosis Orders   1. Well adult exam  DIET GENERAL    Mantoux testing   2. Routine general medical examination at a health care facility  medroxyPROGESTERone (DEPO-PROVERA) 150 MG/ML injection   3. Autism disorder     4. Requires assistance with activities of daily living (ADL)     5. Thoracogenic scoliosis of thoracic region     6. Chronic bilateral back pain, unspecified back location  acetaminophen (TYLENOL) 500 MG tablet    cyclobenzaprine (FLEXERIL) 10 mg tablet   7. Drug-induced constipation  docusate sodium (STOOL SOFTENER) 100 mg capsule    lactulose (CHRONULAC) 10 GM/15ML solution    polyethylene glycol (GLYCOLAX) 17 GM/SCOOP powder   8. Impaired glucose tolerance  levothyroxine (SYNTHROID) 25 MCG tablet   9. Mixed hyperlipidemia  levothyroxine (SYNTHROID) 25 MCG tablet   10. Congenital hypothyroidism without goiter  levothyroxine (SYNTHROID) 25 MCG tablet     No results found for this visit on 06/08/22. Plan:     1. Well adult exam    2. Routine general medical examination at a health care facility    3. Autism disorder    4. Requires assistance with activities of daily living (ADL)    5. Thoracogenic scoliosis of thoracic region    6. Chronic bilateral back pain, unspecified back location    7. Drug-induced constipation    8. Impaired glucose tolerance    9. Mixed hyperlipidemia    10. Congenital hypothyroidism without goiter      Over 50% of the total visit time of 60 minutes was spent on counseling and or coordination of care of well adult exam, routine general medical exam, autism disorder, requires assistance with daily living, scoliosis, chronic bilateral back pain, drug-induced constipation, impaired glucose tolerance, mixed hyperlipidemia, and congenital hypothyroidism.       Return in about 3 months (around 9/8/2022). Orders Placed This Encounter   Procedures    Mantoux testing    DIET GENERAL     Regular diet     Standing Status:   Standing     Number of Occurrences:   360     Standing Expiration Date:   6/8/2023     Scheduling Instructions:      Needs dietary order for admission to [facility]. Not admitted to hospital.     Orders Placed This Encounter   Medications    docusate sodium (STOOL SOFTENER) 100 mg capsule     Sig: Take 1 capsule by mouth daily     Dispense:  30 capsule     Refill:  5    lactulose (CHRONULAC) 10 GM/15ML solution     Sig: Take 30 mL every 2 hrs up 3 times in 24 hrs until large BM produced     Dispense:  270 mL     Refill:  0    levothyroxine (SYNTHROID) 25 MCG tablet     Sig: TAKE 1 TABLET BY MOUTH ONCE DAILY     Dispense:  90 tablet     Refill:  2    polyethylene glycol (GLYCOLAX) 17 GM/SCOOP powder     Sig: Take 17 g by mouth daily as needed (constipation)     Dispense:  850 g     Refill:  2    acetaminophen (TYLENOL) 500 MG tablet     Sig: Take 1 tablet by mouth 4 times daily as needed for Pain     Dispense:  120 tablet     Refill:  0    cyclobenzaprine (FLEXERIL) 10 mg tablet     Sig: Take 1 tablet by mouth nightly     Dispense:  30 tablet     Refill:  1    medroxyPROGESTERone (DEPO-PROVERA) 150 MG/ML injection     Sig: Inject 1 mL into the muscle every 3 months     Dispense:  1 mL     Refill:  5     Can add verbal order for needles and syringe if needed       Patient Instructions   Begin flexeril 10mg at bedtime. [facility]. Patient/family given educational materials - see patient instructions. Discusseduse, benefit, and side effects of prescribed medications. All patient/family questions answered and voiced understanding. Instructed to continuecurrent medications, diet and exercise. Pt/family agreed with treatment plan. Follow up as directed and sooner if needed.      Patient/ family instructed that is symptoms worsen or persist they are to contact office or report to nearest ER. They voice understanding and agreement withthis plan.      Electronically signed by ASAD Vidal on 6/13/2022 at 9:53 AM MARKYT

## 2022-06-08 NOTE — PATIENT INSTRUCTIONS
Begin flexeril 10mg at bedtime. Link for [employer] [employer] [facility] to send TB skin test picture.

## 2022-06-08 NOTE — PROGRESS NOTES
After obtaining consent, and per orders of ASAD Cid, injection of tuberculin given in Right arm by Roberto Carlos Campa. Patient instructed to remain in clinic for 20 minutes afterwards, and to report any adverse reaction to me immediately.

## 2022-06-13 PROBLEM — M54.9 CHRONIC BILATERAL BACK PAIN: Status: ACTIVE | Noted: 2022-06-13

## 2022-06-13 PROBLEM — K59.03 DRUG-INDUCED CONSTIPATION: Status: ACTIVE | Noted: 2022-06-13

## 2022-06-13 PROBLEM — G89.29 CHRONIC BILATERAL BACK PAIN: Status: ACTIVE | Noted: 2022-06-13

## 2022-06-13 PROBLEM — Z00.00 WELL ADULT EXAM: Status: ACTIVE | Noted: 2022-06-13

## 2022-06-13 ASSESSMENT — ENCOUNTER SYMPTOMS: BACK PAIN: 1

## 2022-07-13 PROBLEM — Z00.00 ROUTINE GENERAL MEDICAL EXAMINATION AT A HEALTH CARE FACILITY: Status: RESOLVED | Noted: 2022-06-13 | Resolved: 2022-07-13

## 2022-07-19 DIAGNOSIS — M54.9 CHRONIC BILATERAL BACK PAIN, UNSPECIFIED BACK LOCATION: ICD-10-CM

## 2022-07-19 DIAGNOSIS — G89.29 CHRONIC BILATERAL BACK PAIN, UNSPECIFIED BACK LOCATION: ICD-10-CM

## 2022-07-19 RX ORDER — CYCLOBENZAPRINE HCL 5 MG
5 TABLET ORAL NIGHTLY PRN
Qty: 30 TABLET | Refills: 1 | Status: SHIPPED | OUTPATIENT
Start: 2022-07-19 | End: 2022-08-18

## 2022-09-08 ENCOUNTER — OFFICE VISIT (OUTPATIENT)
Dept: PRIMARY CARE CLINIC | Age: 32
End: 2022-09-08
Payer: MEDICARE

## 2022-09-08 VITALS
WEIGHT: 116.2 LBS | HEIGHT: 63 IN | SYSTOLIC BLOOD PRESSURE: 122 MMHG | DIASTOLIC BLOOD PRESSURE: 78 MMHG | TEMPERATURE: 97.9 F | OXYGEN SATURATION: 99 % | HEART RATE: 81 BPM | BODY MASS INDEX: 20.59 KG/M2

## 2022-09-08 DIAGNOSIS — K59.03 DRUG-INDUCED CONSTIPATION: ICD-10-CM

## 2022-09-08 DIAGNOSIS — F84.0 AUTISM DISORDER: Chronic | ICD-10-CM

## 2022-09-08 DIAGNOSIS — N92.6 IRREGULAR MENSTRUAL CYCLE: Primary | ICD-10-CM

## 2022-09-08 DIAGNOSIS — Z74.1 REQUIRES ASSISTANCE WITH ACTIVITIES OF DAILY LIVING (ADL): ICD-10-CM

## 2022-09-08 DIAGNOSIS — F41.1 GAD (GENERALIZED ANXIETY DISORDER): ICD-10-CM

## 2022-09-08 PROCEDURE — G8420 CALC BMI NORM PARAMETERS: HCPCS | Performed by: FAMILY MEDICINE

## 2022-09-08 PROCEDURE — 1036F TOBACCO NON-USER: CPT | Performed by: FAMILY MEDICINE

## 2022-09-08 PROCEDURE — 99214 OFFICE O/P EST MOD 30 MIN: CPT | Performed by: FAMILY MEDICINE

## 2022-09-08 PROCEDURE — G8427 DOCREV CUR MEDS BY ELIG CLIN: HCPCS | Performed by: FAMILY MEDICINE

## 2022-09-08 RX ORDER — BUSPIRONE HYDROCHLORIDE 5 MG/1
5 TABLET ORAL 2 TIMES DAILY
Qty: 60 TABLET | Refills: 0 | Status: SHIPPED | OUTPATIENT
Start: 2022-09-08 | End: 2022-10-04

## 2022-09-08 RX ORDER — MEDROXYPROGESTERONE ACETATE 150 MG/ML
150 INJECTION, SUSPENSION INTRAMUSCULAR ONCE
Status: COMPLETED | OUTPATIENT
Start: 2022-09-08 | End: 2022-09-09

## 2022-09-08 RX ORDER — CYCLOBENZAPRINE HCL 5 MG
5 TABLET ORAL 3 TIMES DAILY PRN
COMMUNITY
End: 2022-09-08

## 2022-09-08 RX ORDER — DOCUSATE SODIUM 100 MG/1
100 CAPSULE, LIQUID FILLED ORAL 2 TIMES DAILY
Qty: 60 CAPSULE | Refills: 0 | Status: SHIPPED | OUTPATIENT
Start: 2022-09-08 | End: 2022-10-08

## 2022-09-08 SDOH — ECONOMIC STABILITY: FOOD INSECURITY: WITHIN THE PAST 12 MONTHS, THE FOOD YOU BOUGHT JUST DIDN'T LAST AND YOU DIDN'T HAVE MONEY TO GET MORE.: SOMETIMES TRUE

## 2022-09-08 SDOH — HEALTH STABILITY: PHYSICAL HEALTH: ON AVERAGE, HOW MANY MINUTES DO YOU ENGAGE IN EXERCISE AT THIS LEVEL?: 10 MIN

## 2022-09-08 SDOH — ECONOMIC STABILITY: FOOD INSECURITY: WITHIN THE PAST 12 MONTHS, YOU WORRIED THAT YOUR FOOD WOULD RUN OUT BEFORE YOU GOT MONEY TO BUY MORE.: SOMETIMES TRUE

## 2022-09-08 SDOH — HEALTH STABILITY: PHYSICAL HEALTH: ON AVERAGE, HOW MANY DAYS PER WEEK DO YOU ENGAGE IN MODERATE TO STRENUOUS EXERCISE (LIKE A BRISK WALK)?: 5 DAYS

## 2022-09-08 ASSESSMENT — SOCIAL DETERMINANTS OF HEALTH (SDOH)
WITHIN THE LAST YEAR, HAVE YOU BEEN HUMILIATED OR EMOTIONALLY ABUSED IN OTHER WAYS BY YOUR PARTNER OR EX-PARTNER?: NO
HOW HARD IS IT FOR YOU TO PAY FOR THE VERY BASICS LIKE FOOD, HOUSING, MEDICAL CARE, AND HEATING?: NOT VERY HARD
WITHIN THE LAST YEAR, HAVE YOU BEEN AFRAID OF YOUR PARTNER OR EX-PARTNER?: NO
WITHIN THE LAST YEAR, HAVE YOU BEEN KICKED, HIT, SLAPPED, OR OTHERWISE PHYSICALLY HURT BY YOUR PARTNER OR EX-PARTNER?: NO
WITHIN THE LAST YEAR, HAVE TO BEEN RAPED OR FORCED TO HAVE ANY KIND OF SEXUAL ACTIVITY BY YOUR PARTNER OR EX-PARTNER?: NO

## 2022-09-08 NOTE — PROGRESS NOTES
[address] [facility]  [address] [address]  [address] [address]  Dept: [phone]  Dept Fax: [phone]  Loc: [phone]      Subjective:     Chief Complaint   Patient presents with    Establish Care     Former Dr Mita Sanchez patient    Discuss Medications     Wants to discuss changes to stool softner and possible anxiety medication       HPI:  Chacha Matthews is a 33 y.o. female presents today to get establish. She gets depo-provera to get her period regulated. She  used to get his shot thru her pharmacy but apparently the pharmacy wont be able to administer it to her anymore. Her adult foster provider brought her in here today. Pt is mentally challenged. She is reported to be highly anxious since the death of her guardian (family member)    ROS:   Review of Systems   Unable to perform ROS: Psychiatric disorder  - pt is autistic    PMHx:  Past Medical History:   Diagnosis Date    History of underactive thyroid      Patient Active Problem List   Diagnosis    Impaired glucose tolerance    Mixed hyperlipidemia    Congenital hypothyroidism without goiter    Autism disorder    Requires assistance with activities of daily living (ADL)    Chronic bilateral back pain    Drug-induced constipation       PSHx:  Past Surgical History:   Procedure Laterality Date    BREAST BIOPSY      Seveal years ago    OTHER SURGICAL HISTORY  7/8/15    fecal disempaction       PFHx:  Family History   Problem Relation Age of Onset    Cancer Father     Cancer Maternal Grandfather         ? SocialHx:  Social History     Tobacco Use    Smoking status: Never    Smokeless tobacco: Never   Substance Use Topics    Alcohol use: No     Alcohol/week: 0.0 standard drinks       Allergies:   Allergies   Allergen Reactions    Latex     Betadine [Povidone Iodine]     Sulfa Antibiotics        Medications:  Current Outpatient Medications   Medication Sig Dispense Refill    docusate sodium (COLACE) 100 MG capsule Take 1 capsule by mouth 2 times daily 60 capsule 0    busPIRone (BUSPAR) 5 MG tablet Take 1 tablet by mouth 2 times daily 60 tablet 0    lactulose (CHRONULAC) 10 GM/15ML solution Take 30 mL every 2 hrs up 3 times in 24 hrs until large BM produced 270 mL 0    levothyroxine (SYNTHROID) 25 MCG tablet TAKE 1 TABLET BY MOUTH ONCE DAILY 90 tablet 2    Misc. Devices MISC Wipes and gloves  Dx:  Fecal incontinence 120 each 11     Current Facility-Administered Medications   Medication Dose Route Frequency Provider Last Rate Last Admin    medroxyPROGESTERone (DEPO-PROVERA) injection 150 mg  150 mg IntraMUSCular Once Dottie Martinez MD           Objective:   PE:  /78   Pulse 81   Temp 97.9 °F (36.6 °C) (Temporal)   Ht 5' 3\" (1.6 m)   Wt 116 lb 3.2 oz (52.7 kg)   LMP 08/24/2022   SpO2 99%   BMI 20.58 kg/m²   Physical Exam  Vitals and nursing note reviewed. Exam conducted with a chaperone present (foster guardian). Constitutional:       Comments: mental challenged   HENT:      Right Ear: External ear normal.      Left Ear: External ear normal.   Cardiovascular:      Rate and Rhythm: Normal rate and regular rhythm. Pulses: Normal pulses. Heart sounds: Normal heart sounds. Pulmonary:      Breath sounds: Normal breath sounds. Abdominal:      Palpations: Abdomen is soft. Tenderness: There is no abdominal tenderness. Psychiatric:         Attention and Perception: She is inattentive. Mood and Affect: Mood is anxious. Speech: She is noncommunicative. Behavior: Behavior is agitated. Judgment: Judgment is inappropriate. Assessment & Plan   Isabel Hardin was seen today for establish care and discuss medications. Diagnoses and all orders for this visit:    Irregular menstrual cycle  -     medroxyPROGESTERone (DEPO-PROVERA) injection 150 mg    Drug-induced constipation  -     docusate sodium (COLACE) 100 MG capsule;  Take 1 capsule by mouth 2 times daily    SALINA (generalized anxiety disorder)  -     busPIRone (BUSPAR) 5 MG tablet; Take 1 tablet by mouth 2 times daily    Autism disorder    Requires assistance with activities of daily living (ADL)      Return in 4 weeks (on 10/6/2022) for med check, routine follow-up. All questions were answered. Medications, including possible adverse effects, and instructions were reviewed and  understanding was confirmed. Follow-up recommendations, including when to contact or return to office (ie; if symptoms worsen or fail to improve), were discussed and acknowledged.     Electronically signed by Dalila Ariza MD on 9/8/22 at 11:06 AM CDT

## 2022-09-09 PROCEDURE — 96372 THER/PROPH/DIAG INJ SC/IM: CPT | Performed by: FAMILY MEDICINE

## 2022-09-09 RX ADMIN — MEDROXYPROGESTERONE ACETATE 150 MG: 150 INJECTION, SUSPENSION INTRAMUSCULAR at 15:46

## 2022-09-26 ENCOUNTER — TELEPHONE (OUTPATIENT)
Dept: PRIMARY CARE CLINIC | Age: 32
End: 2022-09-26

## 2022-09-26 NOTE — TELEPHONE ENCOUNTER
Spoke with patients foster parents and she stated she was on her last box of gloves and wipes and she needed a prescription faxed to [facility], will hand write on script pad and have provider sign and fax today 09-.

## 2022-10-04 DIAGNOSIS — F41.1 GAD (GENERALIZED ANXIETY DISORDER): ICD-10-CM

## 2022-10-04 RX ORDER — BUSPIRONE HYDROCHLORIDE 5 MG/1
TABLET ORAL
Qty: 60 TABLET | Refills: 0 | Status: SHIPPED | OUTPATIENT
Start: 2022-10-04 | End: 2022-11-03

## 2022-10-04 NOTE — TELEPHONE ENCOUNTER
Ana Mejia called to request a refill on her medication.       Last office visit : 9/8/2022   Next office visit : 10/6/2022     Requested Prescriptions     Pending Prescriptions Disp Refills    busPIRone (BUSPAR) 5 MG tablet [Pharmacy Med Name: BUSPIRONE HCL 5 MG TABS 5 Tablet] 60 tablet 0     Sig: TAKE ONE TABLET BY MOUTH TWICE A DAY            Levar Segovia MA

## 2022-10-06 ENCOUNTER — OFFICE VISIT (OUTPATIENT)
Dept: PRIMARY CARE CLINIC | Age: 32
End: 2022-10-06
Payer: MEDICARE

## 2022-10-06 VITALS
SYSTOLIC BLOOD PRESSURE: 120 MMHG | HEIGHT: 63 IN | WEIGHT: 115 LBS | BODY MASS INDEX: 20.38 KG/M2 | DIASTOLIC BLOOD PRESSURE: 78 MMHG

## 2022-10-06 DIAGNOSIS — F84.0 AUTISM DISORDER: Chronic | ICD-10-CM

## 2022-10-06 DIAGNOSIS — F41.1 GAD (GENERALIZED ANXIETY DISORDER): Primary | ICD-10-CM

## 2022-10-06 PROCEDURE — G8420 CALC BMI NORM PARAMETERS: HCPCS | Performed by: FAMILY MEDICINE

## 2022-10-06 PROCEDURE — 99213 OFFICE O/P EST LOW 20 MIN: CPT | Performed by: FAMILY MEDICINE

## 2022-10-06 PROCEDURE — 1036F TOBACCO NON-USER: CPT | Performed by: FAMILY MEDICINE

## 2022-10-06 PROCEDURE — G8427 DOCREV CUR MEDS BY ELIG CLIN: HCPCS | Performed by: FAMILY MEDICINE

## 2022-10-06 PROCEDURE — G8484 FLU IMMUNIZE NO ADMIN: HCPCS | Performed by: FAMILY MEDICINE

## 2022-10-06 NOTE — PROGRESS NOTES
[address] [place] [place] [facility]  [address] [address] [address]  Dept: [phone]  Dept Fax: [phone]  Loc: [phone]      Subjective:     Chief Complaint   Patient presents with    1 Month Follow-Up    Discuss Medications     Patients caregiver states new medication buspirone is working        HPI:  Felicitas Abreu is a 33 y.o. female presents today for follow-up of new meds. She is reported to be doing well on current dose of Buspar. Pt is autistic. ROS:   Review of Systems   Unable to perform ROS: Psychiatric disorder     PMHx:  Past Medical History:   Diagnosis Date    History of underactive thyroid      Patient Active Problem List   Diagnosis    Impaired glucose tolerance    Mixed hyperlipidemia    Congenital hypothyroidism without goiter    Autism disorder    Requires assistance with activities of daily living (ADL)    Chronic bilateral back pain    Drug-induced constipation       PSHx:  Past Surgical History:   Procedure Laterality Date    BREAST BIOPSY      Seveal years ago    OTHER SURGICAL HISTORY  7/8/15    fecal disempaction       PFHx:  Family History   Problem Relation Age of Onset    Cancer Father     Cancer Maternal Grandfather         ? SocialHx:  Social History     Tobacco Use    Smoking status: Never    Smokeless tobacco: Never   Substance Use Topics    Alcohol use: No     Alcohol/week: 0.0 standard drinks       Allergies:   Allergies   Allergen Reactions    Latex     Betadine [Povidone Iodine]     Sulfa Antibiotics        Medications:  Current Outpatient Medications   Medication Sig Dispense Refill    busPIRone (BUSPAR) 5 MG tablet TAKE ONE TABLET BY MOUTH TWICE A DAY 60 tablet 0    Incontinence Supply Disposable (A + D PERSONAL CARE WIPES) MISC Use as directed 100 each 2    Disposable Gloves MISC Use as directed 100 each 2    docusate sodium (COLACE) 100 MG capsule Take 1 capsule by mouth 2 times daily 60 capsule 0    lactulose (CHRONULAC) 10 GM/15ML solution Take 30 mL every 2 hrs up 3 times in 24 hrs until large BM produced 270 mL 0    levothyroxine (SYNTHROID) 25 MCG tablet TAKE 1 TABLET BY MOUTH ONCE DAILY 90 tablet 2    Misc. Devices MISC Wipes and gloves  Dx:  Fecal incontinence 120 each 11     No current facility-administered medications for this visit. Objective:   PE:  /78   Ht 5' 3\" (1.6 m)   Wt 115 lb (52.2 kg)   BMI 20.37 kg/m²   Physical Exam  Vitals and nursing note reviewed. Exam conducted with a chaperone present (foster guardian). Constitutional:       Comments: mental challenged   HENT:      Right Ear: External ear normal.      Left Ear: External ear normal.   Cardiovascular:      Rate and Rhythm: Normal rate and regular rhythm. Pulses: Normal pulses. Heart sounds: Normal heart sounds. Pulmonary:      Breath sounds: Normal breath sounds. Abdominal:      Palpations: Abdomen is soft. Tenderness: There is no abdominal tenderness. Psychiatric:         Attention and Perception: She is inattentive. Mood and Affect: Mood is not anxious. Affect is inappropriate. Speech: She is communicative. Behavior: Behavior is not agitated. Behavior is cooperative. Judgment: Judgment is inappropriate. Assessment & Plan   Lei Musa was seen today for 1 month follow-up and discuss medications. Diagnoses and all orders for this visit:    SALINA (generalized anxiety disorder)    Autism disorder    Continue Buspar at present dose. Return in about 2 months (around 12/20/2022) for med check, med refills. All questions were answered. Medications, including possible adverse effects, and instructions were reviewed and  understanding was confirmed. Follow-up recommendations, including when to contact or return to office (ie; if symptoms worsen or fail to improve), were discussed and acknowledged.     Electronically signed by Medhat Shrestha MD on 10/6/22 at 11:30 AM CDT

## 2022-10-07 DIAGNOSIS — K59.03 DRUG-INDUCED CONSTIPATION: ICD-10-CM

## 2022-10-08 RX ORDER — DOCUSATE SODIUM 100 MG/1
100 CAPSULE, LIQUID FILLED ORAL 2 TIMES DAILY
Qty: 60 CAPSULE | Refills: 5 | Status: SHIPPED | OUTPATIENT
Start: 2022-10-08 | End: 2022-11-07

## 2022-11-01 DIAGNOSIS — F41.1 GAD (GENERALIZED ANXIETY DISORDER): ICD-10-CM

## 2022-11-03 RX ORDER — BUSPIRONE HYDROCHLORIDE 5 MG/1
TABLET ORAL
Qty: 60 TABLET | Refills: 0 | Status: SHIPPED | OUTPATIENT
Start: 2022-11-03 | End: 2022-11-30

## 2022-11-29 DIAGNOSIS — F41.1 GAD (GENERALIZED ANXIETY DISORDER): ICD-10-CM

## 2022-11-29 NOTE — TELEPHONE ENCOUNTER
Prosper Adrianna called to request a refill on her medication.       Last office visit : 10/6/2022   Next office visit : 12/20/2022     Requested Prescriptions     Pending Prescriptions Disp Refills    busPIRone (BUSPAR) 5 MG tablet [Pharmacy Med Name: BUSPIRONE HCL 5 MG TABS 5 Tablet] 60 tablet 0     Sig: TAKE ONE TABLET BY MOUTH TWICE A DAY            Brianda Ferrera MA

## 2022-11-30 RX ORDER — BUSPIRONE HYDROCHLORIDE 5 MG/1
TABLET ORAL
Qty: 60 TABLET | Refills: 0 | Status: SHIPPED | OUTPATIENT
Start: 2022-11-30

## 2022-12-27 DIAGNOSIS — F41.1 GAD (GENERALIZED ANXIETY DISORDER): ICD-10-CM

## 2022-12-27 RX ORDER — BUSPIRONE HYDROCHLORIDE 5 MG/1
TABLET ORAL
Qty: 60 TABLET | Refills: 0 | Status: SHIPPED | OUTPATIENT
Start: 2022-12-27

## 2022-12-27 NOTE — TELEPHONE ENCOUNTER
Peyton Douglas called to request a refill on her medication.       Last office visit : 10/6/2022   Next office visit : 12/28/2022     Requested Prescriptions     Pending Prescriptions Disp Refills    busPIRone (BUSPAR) 5 MG tablet [Pharmacy Med Name: BUSPIRONE HCL 5 MG TABS 5 Tablet] 60 tablet 0     Sig: TAKE ONE TABLET BY MOUTH TWICE A DAY            Samy Frazier MA

## 2022-12-28 ENCOUNTER — OFFICE VISIT (OUTPATIENT)
Dept: PRIMARY CARE CLINIC | Age: 32
End: 2022-12-28
Payer: MEDICARE

## 2022-12-28 VITALS
DIASTOLIC BLOOD PRESSURE: 78 MMHG | HEART RATE: 96 BPM | WEIGHT: 116.2 LBS | OXYGEN SATURATION: 99 % | SYSTOLIC BLOOD PRESSURE: 116 MMHG | HEIGHT: 63 IN | BODY MASS INDEX: 20.59 KG/M2 | TEMPERATURE: 98 F

## 2022-12-28 DIAGNOSIS — F41.1 GAD (GENERALIZED ANXIETY DISORDER): Primary | ICD-10-CM

## 2022-12-28 DIAGNOSIS — Z76.0 MEDICATION REFILL: ICD-10-CM

## 2022-12-28 DIAGNOSIS — F84.0 AUTISM DISORDER: Chronic | ICD-10-CM

## 2022-12-28 DIAGNOSIS — K59.03 DRUG-INDUCED CONSTIPATION: ICD-10-CM

## 2022-12-28 PROCEDURE — G8427 DOCREV CUR MEDS BY ELIG CLIN: HCPCS | Performed by: FAMILY MEDICINE

## 2022-12-28 PROCEDURE — G8484 FLU IMMUNIZE NO ADMIN: HCPCS | Performed by: FAMILY MEDICINE

## 2022-12-28 PROCEDURE — 99213 OFFICE O/P EST LOW 20 MIN: CPT | Performed by: FAMILY MEDICINE

## 2022-12-28 PROCEDURE — 1036F TOBACCO NON-USER: CPT | Performed by: FAMILY MEDICINE

## 2022-12-28 PROCEDURE — G8420 CALC BMI NORM PARAMETERS: HCPCS | Performed by: FAMILY MEDICINE

## 2022-12-28 RX ORDER — LACTULOSE 10 G/15ML
SOLUTION ORAL
Qty: 270 ML | Refills: 2 | Status: SHIPPED | OUTPATIENT
Start: 2022-12-28

## 2022-12-28 NOTE — PROGRESS NOTES
[address] [facility]  [address] [facility] [address] [address]  Dept: [phone]  Dept Fax: [phone]  Loc: [phone]      Subjective:     Chief Complaint   Patient presents with    Follow-up     2 month       HPI:  Elida Serrano is a 33 y.o. female presents today for her 2 month follow-up. She is accompanied by her care giver  New medication is working well for her SALINA. No adverse drug reaction reported. ROS:   Review of Systems   Unable to perform ROS: Psychiatric disorder     PMHx:  Past Medical History:   Diagnosis Date    History of underactive thyroid      Patient Active Problem List   Diagnosis    Impaired glucose tolerance    Mixed hyperlipidemia    Congenital hypothyroidism without goiter    Autism disorder    Requires assistance with activities of daily living (ADL)    Chronic bilateral back pain    Drug-induced constipation       PSHx:  Past Surgical History:   Procedure Laterality Date    BREAST BIOPSY      Seveal years ago    OTHER SURGICAL HISTORY  7/8/15    fecal disempaction       PFHx:  Family History   Problem Relation Age of Onset    Cancer Father     Cancer Maternal Grandfather         ? SocialHx:  Social History     Tobacco Use    Smoking status: Never    Smokeless tobacco: Never   Substance Use Topics    Alcohol use: No     Alcohol/week: 0.0 standard drinks       Allergies:   Allergies   Allergen Reactions    Latex     Betadine [Povidone Iodine]     Sulfa Antibiotics        Medications:  Current Outpatient Medications   Medication Sig Dispense Refill    lactulose (CHRONULAC) 10 GM/15ML solution Take 30 mL every 2 hrs up 3 times in 24 hrs until large BM produced 270 mL 2    busPIRone (BUSPAR) 5 MG tablet TAKE ONE TABLET BY MOUTH TWICE A DAY 60 tablet 0    Incontinence Supply Disposable (A + D PERSONAL CARE WIPES) MISC Use as directed 100 each 2    Disposable Gloves MISC Use as directed 100 each 2    levothyroxine (SYNTHROID) 25 MCG tablet TAKE 1 TABLET BY MOUTH ONCE DAILY 90 tablet 2    Misc. Devices MISC Wipes and gloves  Dx:  Fecal incontinence 120 each 11     No current facility-administered medications for this visit. Objective:   PE:  /78   Pulse 96   Temp 98 °F (36.7 °C) (Temporal)   Ht 5' 3\" (1.6 m)   Wt 116 lb 3.2 oz (52.7 kg)   SpO2 99%   BMI 20.58 kg/m²   Physical Exam  Vitals and nursing note reviewed. Exam conducted with a chaperone present (caregiver). Constitutional:       Comments: mental challenged   HENT:      Right Ear: External ear normal.      Left Ear: External ear normal.      Mouth/Throat:      Pharynx: Oropharynx is clear. Cardiovascular:      Rate and Rhythm: Normal rate and regular rhythm. Pulses: Normal pulses. Heart sounds: Normal heart sounds. Pulmonary:      Breath sounds: Normal breath sounds. Abdominal:      Palpations: Abdomen is soft. Tenderness: There is no abdominal tenderness. Skin:     Findings: No rash. Neurological:      Mental Status: She is alert and oriented to person, place, and time. Psychiatric:         Attention and Perception: She is inattentive. Mood and Affect: Mood is not anxious. Affect is inappropriate. Speech: She is communicative. Behavior: Behavior is not agitated. Behavior is cooperative. Judgment: Judgment is inappropriate. Assessment & Plan   [facility] was [facility] today for follow-up. Diagnoses and all orders for this visit:    SALINA (generalized anxiety disorder)    Drug-induced constipation  -     lactulose (CHRONULAC) 10 GM/15ML solution; Take 30 mL every 2 hrs up 3 times in 24 hrs until large BM produced    Medication refill    Autism disorder      Return in about 6 months (around 6/28/2023) for med check, med refills, routine follow-up with me, chronic care management. All questions were answered.   Medications, including possible adverse effects, and instructions were reviewed and  understanding was confirmed. Follow-up recommendations, including when to contact or return to office (ie; if symptoms worsen or fail to improve), were discussed and acknowledged.     Electronically signed by Ashby Kehr, MD on 12/28/22 at 11:48 AM CST

## 2023-01-09 DIAGNOSIS — E78.2 MIXED HYPERLIPIDEMIA: ICD-10-CM

## 2023-01-09 DIAGNOSIS — E03.1 CONGENITAL HYPOTHYROIDISM WITHOUT GOITER: ICD-10-CM

## 2023-01-09 DIAGNOSIS — R73.02 IMPAIRED GLUCOSE TOLERANCE: ICD-10-CM

## 2023-01-09 RX ORDER — LEVOTHYROXINE SODIUM 0.03 MG/1
TABLET ORAL
Qty: 90 TABLET | Refills: 1 | Status: SHIPPED | OUTPATIENT
Start: 2023-01-09

## 2023-01-09 NOTE — TELEPHONE ENCOUNTER
Arthur Arce called to request a refill on her medication. Last office visit : 12/28/2022   Next office visit : 6/28/2023     Requested Prescriptions     Pending Prescriptions Disp Refills    levothyroxine (SYNTHROID) 25 MCG tablet [Pharmacy Med Name: LEVOTHYROXINE SODIUM 25 MCG 25 Tablet] 90 tablet 2     Sig: TAKE ONE TABLET BY MOUTH DAILY.             Polina Horton LPN

## 2023-02-07 DIAGNOSIS — F41.1 GAD (GENERALIZED ANXIETY DISORDER): ICD-10-CM

## 2023-02-08 RX ORDER — BUSPIRONE HYDROCHLORIDE 5 MG/1
TABLET ORAL
Qty: 60 TABLET | Refills: 0 | Status: SHIPPED | OUTPATIENT
Start: 2023-02-08

## 2023-02-28 ENCOUNTER — OFFICE VISIT (OUTPATIENT)
Dept: PRIMARY CARE CLINIC | Age: 33
End: 2023-02-28
Payer: MEDICARE

## 2023-02-28 VITALS
WEIGHT: 113.8 LBS | BODY MASS INDEX: 20.16 KG/M2 | SYSTOLIC BLOOD PRESSURE: 128 MMHG | DIASTOLIC BLOOD PRESSURE: 78 MMHG | HEART RATE: 104 BPM | TEMPERATURE: 97.5 F | OXYGEN SATURATION: 100 %

## 2023-02-28 DIAGNOSIS — R19.5 PASSAGE OF LOOSE STOOLS: ICD-10-CM

## 2023-02-28 DIAGNOSIS — Z74.1 REQUIRES ASSISTANCE WITH ACTIVITIES OF DAILY LIVING (ADL): ICD-10-CM

## 2023-02-28 DIAGNOSIS — F84.0 AUTISM DISORDER: Chronic | ICD-10-CM

## 2023-02-28 DIAGNOSIS — K59.03 DRUG-INDUCED CONSTIPATION: Primary | ICD-10-CM

## 2023-02-28 LAB — LACTOFERRIN, FECAL: POSITIVE

## 2023-02-28 PROCEDURE — 1036F TOBACCO NON-USER: CPT | Performed by: FAMILY MEDICINE

## 2023-02-28 PROCEDURE — G8427 DOCREV CUR MEDS BY ELIG CLIN: HCPCS | Performed by: FAMILY MEDICINE

## 2023-02-28 PROCEDURE — G8484 FLU IMMUNIZE NO ADMIN: HCPCS | Performed by: FAMILY MEDICINE

## 2023-02-28 PROCEDURE — 99213 OFFICE O/P EST LOW 20 MIN: CPT | Performed by: FAMILY MEDICINE

## 2023-02-28 PROCEDURE — G8420 CALC BMI NORM PARAMETERS: HCPCS | Performed by: FAMILY MEDICINE

## 2023-02-28 NOTE — PROGRESS NOTES
[address] [facility]  [address] [address] [place] [address] [place] [address]  Dept: [phone]  Dept Fax: [phone]  Loc: [phone]      Subjective:     Chief Complaint   Patient presents with    Diarrhea     Has been leaking when changed the last few days   thinks she may be impacted        HPI:  Mildred Steward is a 29 y.o. female presents today with complaints of loose stools. Her stool is reported to have a foul smell to it as well. Appetite is normal. No fever. No nausea or vomiting reported. Her  who brought her in today states that she seems backed up and is having seepage of loose stools from it. She is also starting to be violent (physically) in the last 2 days. Last week she tested for Covid. She already  tested negative 4 days ago    ROS:   Review of Systems   Unable to perform ROS: Psychiatric disorder     PMHx:  Past Medical History:   Diagnosis Date    History of underactive thyroid      Patient Active Problem List   Diagnosis    Impaired glucose tolerance    Mixed hyperlipidemia    Congenital hypothyroidism without goiter    Autism disorder    Requires assistance with activities of daily living (ADL)    Chronic bilateral back pain    Drug-induced constipation       PSHx:  Past Surgical History:   Procedure Laterality Date    BREAST BIOPSY      Seveal years ago    OTHER SURGICAL HISTORY  7/8/15    fecal disempaction       PFHx:  Family History   Problem Relation Age of Onset    Cancer Father     Cancer Maternal Grandfather         ? SocialHx:  Social History     Tobacco Use    Smoking status: Never    Smokeless tobacco: Never   Substance Use Topics    Alcohol use: No     Alcohol/week: 0.0 standard drinks       Allergies:   Allergies   Allergen Reactions    Latex     Betadine [Povidone Iodine]     Sulfa Antibiotics        Medications:  Current Outpatient Medications   Medication Sig Dispense Refill    busPIRone (BUSPAR) 5 MG tablet TAKE ONE TABLET BY MOUTH TWICE A DAY 60 tablet 0    levothyroxine (SYNTHROID) 25 MCG tablet TAKE ONE TABLET BY MOUTH DAILY. 90 tablet 1    lactulose (CHRONULAC) 10 GM/15ML solution Take 30 mL every 2 hrs up 3 times in 24 hrs until large BM produced 270 mL 2    Incontinence Supply Disposable (A + D PERSONAL CARE WIPES) MISC Use as directed 100 each 2    Disposable Gloves MISC Use as directed 100 each 2    Misc. Devices MISC Wipes and gloves  Dx:  Fecal incontinence 120 each 11     No current facility-administered medications for this visit. Objective:   PE:  /78   Pulse (!) 104   Temp 97.5 °F (36.4 °C)   Wt 113 lb 12.8 oz (51.6 kg)   SpO2 100%   BMI 20.16 kg/m²   Physical Exam  Vitals and nursing note reviewed. Exam conducted with a chaperone present (foster caregiver). Constitutional:       Comments: Cooperative today, mentally  challenged   Cardiovascular:      Rate and Rhythm: Normal rate and regular rhythm. Pulses: Normal pulses. Heart sounds: Normal heart sounds. Pulmonary:      Breath sounds: Normal breath sounds. Abdominal:      Palpations: Abdomen is soft. Tenderness: There is no abdominal tenderness. Skin:     Findings: No rash. Neurological:      Mental Status: She is alert. Psychiatric:         Attention and Perception: She is inattentive. Mood and Affect: Mood is not anxious. Affect is inappropriate. Speech: She is communicative. Behavior: Behavior is agitated and aggressive. Judgment: Judgment is inappropriate. Assessment & Plan   Kristel Daley was seen today for diarrhea. Diagnoses and all orders for this visit:    Drug-induced constipation    Passage of loose stools  -     Culture, Stool; Future  -     Fecal Leukocytes;  Future    Autism disorder    Requires assistance with activities of daily living (ADL)    Okay to restart Miralax  Increase oral fluid intake    Return in 4 months (on 6/28/2023) for chronic care management. All questions were answered. Medications, including possible adverse effects, and instructions were reviewed and  understanding was confirmed. Follow-up recommendations, including when to contact or return to office (ie; if symptoms worsen or fail to improve), were discussed and acknowledged.     Electronically signed by Donnie Charles MD on 2/28/23 at 10:56 AM CST

## 2023-03-02 LAB
CAMPYLOBACTER ANTIGEN: NORMAL
CULTURE, STOOL: NORMAL
E COLI SHIGA TOXIN ASSAY: NORMAL

## 2023-03-03 ENCOUNTER — TELEPHONE (OUTPATIENT)
Dept: PRIMARY CARE CLINIC | Age: 33
End: 2023-03-03

## 2023-03-07 DIAGNOSIS — F41.1 GAD (GENERALIZED ANXIETY DISORDER): ICD-10-CM

## 2023-03-07 RX ORDER — BUSPIRONE HYDROCHLORIDE 5 MG/1
TABLET ORAL
Qty: 60 TABLET | Refills: 2 | Status: SHIPPED | OUTPATIENT
Start: 2023-03-07

## 2023-03-07 NOTE — TELEPHONE ENCOUNTER
Raya Richardson called to request a refill on her medication.       Last office visit : 2/28/2023   Next office visit : 6/28/2023     Requested Prescriptions     Pending Prescriptions Disp Refills    busPIRone (BUSPAR) 5 MG tablet [Pharmacy Med Name: BUSPIRONE HCL 5 MG TABS 5 Tablet] 60 tablet 2     Sig: TAKE ONE TABLET BY MOUTH TWICE A DAY            Jasmina Mo MA

## 2023-03-22 ENCOUNTER — OFFICE VISIT (OUTPATIENT)
Dept: PRIMARY CARE CLINIC | Age: 33
End: 2023-03-22

## 2023-03-22 VITALS
SYSTOLIC BLOOD PRESSURE: 120 MMHG | WEIGHT: 116.2 LBS | BODY MASS INDEX: 20.59 KG/M2 | DIASTOLIC BLOOD PRESSURE: 60 MMHG | OXYGEN SATURATION: 98 % | TEMPERATURE: 97.5 F | HEART RATE: 110 BPM | HEIGHT: 63 IN

## 2023-03-22 DIAGNOSIS — Z00.00 ANNUAL PHYSICAL EXAM: Primary | ICD-10-CM

## 2023-03-22 DIAGNOSIS — F84.0 AUTISM DISORDER: Chronic | ICD-10-CM

## 2023-03-22 DIAGNOSIS — Z02.5 SPORTS PHYSICAL: ICD-10-CM

## 2023-03-22 SDOH — ECONOMIC STABILITY: FOOD INSECURITY: WITHIN THE PAST 12 MONTHS, THE FOOD YOU BOUGHT JUST DIDN'T LAST AND YOU DIDN'T HAVE MONEY TO GET MORE.: NEVER TRUE

## 2023-03-22 SDOH — ECONOMIC STABILITY: FOOD INSECURITY: WITHIN THE PAST 12 MONTHS, YOU WORRIED THAT YOUR FOOD WOULD RUN OUT BEFORE YOU GOT MONEY TO BUY MORE.: NEVER TRUE

## 2023-03-22 SDOH — ECONOMIC STABILITY: HOUSING INSECURITY
IN THE LAST 12 MONTHS, WAS THERE A TIME WHEN YOU DID NOT HAVE A STEADY PLACE TO SLEEP OR SLEPT IN A SHELTER (INCLUDING NOW)?: NO

## 2023-03-22 SDOH — ECONOMIC STABILITY: INCOME INSECURITY: HOW HARD IS IT FOR YOU TO PAY FOR THE VERY BASICS LIKE FOOD, HOUSING, MEDICAL CARE, AND HEATING?: NOT HARD AT ALL

## 2023-03-22 ASSESSMENT — PATIENT HEALTH QUESTIONNAIRE - PHQ9
2. FEELING DOWN, DEPRESSED OR HOPELESS: 0
SUM OF ALL RESPONSES TO PHQ9 QUESTIONS 1 & 2: 0
SUM OF ALL RESPONSES TO PHQ QUESTIONS 1-9: 0
1. LITTLE INTEREST OR PLEASURE IN DOING THINGS: 0
SUM OF ALL RESPONSES TO PHQ QUESTIONS 1-9: 0

## 2023-03-22 NOTE — PROGRESS NOTES
answered. Medications, including possible adverse effects, and instructions were reviewed and  understanding was confirmed. Follow-up recommendations, including when to contact or return to office (ie; if symptoms worsen or fail to improve), were discussed and acknowledged.     Electronically signed by Marlene Almaraz MD on 3/22/23 at 9:36 AM CDT

## 2023-03-27 ENCOUNTER — TELEPHONE (OUTPATIENT)
Dept: PRIMARY CARE CLINIC | Age: 33
End: 2023-03-27

## 2023-03-27 NOTE — TELEPHONE ENCOUNTER
S/w Mervat Guardado from [employer], she called to follow up on a form for pt's incontinence supplies. States she has sent this over several times and spoke to someone on the 17th; I do not see any forms or documentation.

## 2023-04-24 ENCOUNTER — PATIENT MESSAGE (OUTPATIENT)
Dept: PRIMARY CARE CLINIC | Age: 33
End: 2023-04-24

## 2023-04-24 ENCOUNTER — HOSPITAL ENCOUNTER (INPATIENT)
Age: 33
LOS: 1 days | Discharge: HOME | DRG: 394 | End: 2023-04-26
Attending: EMERGENCY MEDICINE | Admitting: STUDENT IN AN ORGANIZED HEALTH CARE EDUCATION/TRAINING PROGRAM
Payer: MEDICARE

## 2023-04-24 ENCOUNTER — OFFICE VISIT (OUTPATIENT)
Dept: PRIMARY CARE CLINIC | Age: 33
End: 2023-04-24

## 2023-04-24 ENCOUNTER — HOSPITAL ENCOUNTER (OUTPATIENT)
Dept: CT IMAGING | Age: 33
Discharge: HOME | DRG: 394 | End: 2023-04-24
Payer: MEDICARE

## 2023-04-24 VITALS
TEMPERATURE: 99 F | OXYGEN SATURATION: 99 % | DIASTOLIC BLOOD PRESSURE: 82 MMHG | HEART RATE: 102 BPM | BODY MASS INDEX: 20.44 KG/M2 | WEIGHT: 115.4 LBS | SYSTOLIC BLOOD PRESSURE: 120 MMHG

## 2023-04-24 DIAGNOSIS — K62.5 FRESH BLOOD PASSED PER RECTUM: Primary | ICD-10-CM

## 2023-04-24 DIAGNOSIS — F84.0 AUTISM DISORDER: ICD-10-CM

## 2023-04-24 DIAGNOSIS — F84.0 AUTISM DISORDER: Chronic | ICD-10-CM

## 2023-04-24 DIAGNOSIS — K56.41 FECAL IMPACTION (HCC): Primary | ICD-10-CM

## 2023-04-24 DIAGNOSIS — K62.5 FRESH BLOOD PASSED PER RECTUM: ICD-10-CM

## 2023-04-24 DIAGNOSIS — K59.01 SLOW TRANSIT CONSTIPATION: ICD-10-CM

## 2023-04-24 DIAGNOSIS — K62.5 RECTAL BLEEDING: ICD-10-CM

## 2023-04-24 PROBLEM — K56.49 IMPACTION OF COLON (HCC): Status: ACTIVE | Noted: 2023-04-24

## 2023-04-24 LAB
ALBUMIN SERPL-MCNC: 5 G/DL (ref 3.5–5.2)
ALP SERPL-CCNC: 117 U/L (ref 35–104)
ALT SERPL-CCNC: 9 U/L (ref 5–33)
ANION GAP SERPL CALCULATED.3IONS-SCNC: 13 MMOL/L (ref 7–19)
AST SERPL-CCNC: 15 U/L (ref 5–32)
BASOPHILS # BLD: 0.1 K/UL (ref 0–0.2)
BASOPHILS NFR BLD: 0.9 % (ref 0–1)
BILIRUB SERPL-MCNC: <0.2 MG/DL (ref 0.2–1.2)
BILIRUB UR QL STRIP: NEGATIVE
BUN SERPL-MCNC: 16 MG/DL (ref 6–20)
CALCIUM SERPL-MCNC: 9.9 MG/DL (ref 8.6–10)
CHLORIDE SERPL-SCNC: 103 MMOL/L (ref 98–111)
CLARITY UR: ABNORMAL
CO2 SERPL-SCNC: 24 MMOL/L (ref 22–29)
COLOR UR: YELLOW
CREAT SERPL-MCNC: 0.7 MG/DL (ref 0.5–0.9)
EOSINOPHIL # BLD: 0.2 K/UL (ref 0–0.6)
EOSINOPHIL NFR BLD: 2.7 % (ref 0–5)
ERYTHROCYTE [DISTWIDTH] IN BLOOD BY AUTOMATED COUNT: 12.6 % (ref 11.5–14.5)
GLUCOSE SERPL-MCNC: 98 MG/DL (ref 74–109)
GLUCOSE UR STRIP.AUTO-MCNC: NEGATIVE MG/DL
HCG SERPL QL: NEGATIVE
HCT VFR BLD AUTO: 41.7 % (ref 37–47)
HGB BLD-MCNC: 13.4 G/DL (ref 12–16)
HGB UR STRIP.AUTO-MCNC: ABNORMAL MG/L
IMM GRANULOCYTES # BLD: 0 K/UL
KETONES UR STRIP.AUTO-MCNC: NEGATIVE MG/DL
LEUKOCYTE ESTERASE UR QL STRIP.AUTO: ABNORMAL
LIPASE SERPL-CCNC: 51 U/L (ref 13–60)
LYMPHOCYTES # BLD: 2.3 K/UL (ref 1.1–4.5)
LYMPHOCYTES NFR BLD: 36.4 % (ref 20–40)
MCH RBC QN AUTO: 30.7 PG (ref 27–31)
MCHC RBC AUTO-ENTMCNC: 32.1 G/DL (ref 33–37)
MCV RBC AUTO: 95.6 FL (ref 81–99)
MONOCYTES # BLD: 0.7 K/UL (ref 0–0.9)
MONOCYTES NFR BLD: 10.9 % (ref 0–10)
NEUTROPHILS # BLD: 3.1 K/UL (ref 1.5–7.5)
NEUTS SEG NFR BLD: 48.6 % (ref 50–65)
NITRITE UR QL STRIP.AUTO: NEGATIVE
PH UR STRIP.AUTO: 7 [PH] (ref 5–8)
PLATELET # BLD AUTO: 305 K/UL (ref 130–400)
PMV BLD AUTO: 9.4 FL (ref 9.4–12.3)
POTASSIUM SERPL-SCNC: 4.1 MMOL/L (ref 3.5–5)
PROT SERPL-MCNC: 8.2 G/DL (ref 6.6–8.7)
PROT UR STRIP.AUTO-MCNC: NEGATIVE MG/DL
RBC # BLD AUTO: 4.36 M/UL (ref 4.2–5.4)
RBC #/AREA URNS HPF: ABNORMAL /HPF (ref 0–2)
SARS-COV-2 RDRP RESP QL NAA+PROBE: NOT DETECTED
SODIUM SERPL-SCNC: 140 MMOL/L (ref 136–145)
SP GR UR STRIP.AUTO: 1.01 (ref 1–1.03)
UROBILINOGEN UR STRIP.AUTO-MCNC: 0.2 E.U./DL
WBC # BLD AUTO: 6.4 K/UL (ref 4.8–10.8)
WBC #/AREA URNS HPF: ABNORMAL /HPF (ref 0–5)

## 2023-04-24 PROCEDURE — 87086 URINE CULTURE/COLONY COUNT: CPT

## 2023-04-24 PROCEDURE — 99152 MOD SED SAME PHYS/QHP 5/>YRS: CPT

## 2023-04-24 PROCEDURE — 80053 COMPREHEN METABOLIC PANEL: CPT

## 2023-04-24 PROCEDURE — 36415 COLL VENOUS BLD VENIPUNCTURE: CPT

## 2023-04-24 PROCEDURE — G0378 HOSPITAL OBSERVATION PER HR: HCPCS

## 2023-04-24 PROCEDURE — 87635 SARS-COV-2 COVID-19 AMP PRB: CPT

## 2023-04-24 PROCEDURE — 84703 CHORIONIC GONADOTROPIN ASSAY: CPT

## 2023-04-24 PROCEDURE — 99285 EMERGENCY DEPT VISIT HI MDM: CPT

## 2023-04-24 PROCEDURE — 81001 URINALYSIS AUTO W/SCOPE: CPT

## 2023-04-24 PROCEDURE — 6360000002 HC RX W HCPCS: Performed by: EMERGENCY MEDICINE

## 2023-04-24 PROCEDURE — 6370000000 HC RX 637 (ALT 250 FOR IP): Performed by: HOSPITALIST

## 2023-04-24 PROCEDURE — 96374 THER/PROPH/DIAG INJ IV PUSH: CPT

## 2023-04-24 PROCEDURE — 99153 MOD SED SAME PHYS/QHP EA: CPT

## 2023-04-24 PROCEDURE — 85025 COMPLETE CBC W/AUTO DIFF WBC: CPT

## 2023-04-24 PROCEDURE — 2500000003 HC RX 250 WO HCPCS: Performed by: EMERGENCY MEDICINE

## 2023-04-24 PROCEDURE — 6370000000 HC RX 637 (ALT 250 FOR IP): Performed by: EMERGENCY MEDICINE

## 2023-04-24 PROCEDURE — 74176 CT ABD & PELVIS W/O CONTRAST: CPT

## 2023-04-24 PROCEDURE — 83690 ASSAY OF LIPASE: CPT

## 2023-04-24 PROCEDURE — 2580000003 HC RX 258: Performed by: EMERGENCY MEDICINE

## 2023-04-24 RX ORDER — ACETAMINOPHEN 650 MG/1
650 SUPPOSITORY RECTAL EVERY 4 HOURS PRN
Status: DISCONTINUED | OUTPATIENT
Start: 2023-04-24 | End: 2023-04-26 | Stop reason: HOSPADM

## 2023-04-24 RX ORDER — ONDANSETRON 2 MG/ML
4 INJECTION INTRAMUSCULAR; INTRAVENOUS EVERY 6 HOURS PRN
Status: DISCONTINUED | OUTPATIENT
Start: 2023-04-24 | End: 2023-04-26 | Stop reason: HOSPADM

## 2023-04-24 RX ORDER — SODIUM PHOSPHATE, DIBASIC AND SODIUM PHOSPHATE, MONOBASIC 7; 19 G/133ML; G/133ML
1 ENEMA RECTAL ONCE
Status: COMPLETED | OUTPATIENT
Start: 2023-04-24 | End: 2023-04-24

## 2023-04-24 RX ORDER — SODIUM CHLORIDE, SODIUM LACTATE, POTASSIUM CHLORIDE, AND CALCIUM CHLORIDE .6; .31; .03; .02 G/100ML; G/100ML; G/100ML; G/100ML
1000 INJECTION, SOLUTION INTRAVENOUS ONCE
Status: COMPLETED | OUTPATIENT
Start: 2023-04-24 | End: 2023-04-24

## 2023-04-24 RX ORDER — KETAMINE HYDROCHLORIDE 50 MG/ML
1 INJECTION, SOLUTION, CONCENTRATE INTRAMUSCULAR; INTRAVENOUS ONCE
Status: DISCONTINUED | OUTPATIENT
Start: 2023-04-24 | End: 2023-04-26 | Stop reason: HOSPADM

## 2023-04-24 RX ORDER — LEVOTHYROXINE SODIUM 0.03 MG/1
25 TABLET ORAL
Status: DISCONTINUED | OUTPATIENT
Start: 2023-04-25 | End: 2023-04-26 | Stop reason: HOSPADM

## 2023-04-24 RX ORDER — SODIUM CHLORIDE 0.9 % (FLUSH) 0.9 %
5-40 SYRINGE (ML) INJECTION EVERY 12 HOURS SCHEDULED
Status: DISCONTINUED | OUTPATIENT
Start: 2023-04-24 | End: 2023-04-26 | Stop reason: HOSPADM

## 2023-04-24 RX ORDER — MECOBALAMIN 5000 MCG
5 TABLET,DISINTEGRATING ORAL NIGHTLY
Status: DISCONTINUED | OUTPATIENT
Start: 2023-04-24 | End: 2023-04-26 | Stop reason: HOSPADM

## 2023-04-24 RX ORDER — ONDANSETRON 4 MG/1
4 TABLET, ORALLY DISINTEGRATING ORAL EVERY 8 HOURS PRN
Status: DISCONTINUED | OUTPATIENT
Start: 2023-04-24 | End: 2023-04-26 | Stop reason: HOSPADM

## 2023-04-24 RX ORDER — MAGNESIUM SULFATE IN WATER 40 MG/ML
2000 INJECTION, SOLUTION INTRAVENOUS PRN
Status: DISCONTINUED | OUTPATIENT
Start: 2023-04-24 | End: 2023-04-26 | Stop reason: HOSPADM

## 2023-04-24 RX ORDER — BUSPIRONE HYDROCHLORIDE 5 MG/1
5 TABLET ORAL 2 TIMES DAILY
Status: DISCONTINUED | OUTPATIENT
Start: 2023-04-24 | End: 2023-04-26 | Stop reason: HOSPADM

## 2023-04-24 RX ORDER — CALCIUM CARBONATE 200(500)MG
500 TABLET,CHEWABLE ORAL 3 TIMES DAILY PRN
Status: DISCONTINUED | OUTPATIENT
Start: 2023-04-24 | End: 2023-04-26 | Stop reason: HOSPADM

## 2023-04-24 RX ORDER — DOCUSATE SODIUM 100 MG/1
300 CAPSULE, LIQUID FILLED ORAL ONCE
Status: COMPLETED | OUTPATIENT
Start: 2023-04-24 | End: 2023-04-24

## 2023-04-24 RX ORDER — ENOXAPARIN SODIUM 100 MG/ML
40 INJECTION SUBCUTANEOUS DAILY
Status: DISCONTINUED | OUTPATIENT
Start: 2023-04-25 | End: 2023-04-26 | Stop reason: HOSPADM

## 2023-04-24 RX ORDER — POTASSIUM CHLORIDE 7.45 MG/ML
10 INJECTION INTRAVENOUS PRN
Status: DISCONTINUED | OUTPATIENT
Start: 2023-04-24 | End: 2023-04-26 | Stop reason: HOSPADM

## 2023-04-24 RX ORDER — MECOBALAMIN 5000 MCG
5 TABLET,DISINTEGRATING ORAL NIGHTLY PRN
Status: DISCONTINUED | OUTPATIENT
Start: 2023-04-24 | End: 2023-04-26 | Stop reason: HOSPADM

## 2023-04-24 RX ORDER — DIPHENHYDRAMINE HYDROCHLORIDE 50 MG/ML
50 INJECTION INTRAMUSCULAR; INTRAVENOUS ONCE
Status: COMPLETED | OUTPATIENT
Start: 2023-04-24 | End: 2023-04-24

## 2023-04-24 RX ORDER — SODIUM CHLORIDE 9 MG/ML
INJECTION, SOLUTION INTRAVENOUS PRN
Status: DISCONTINUED | OUTPATIENT
Start: 2023-04-24 | End: 2023-04-26 | Stop reason: HOSPADM

## 2023-04-24 RX ORDER — KETAMINE HYDROCHLORIDE 50 MG/ML
200 INJECTION, SOLUTION, CONCENTRATE INTRAMUSCULAR; INTRAVENOUS ONCE
Status: COMPLETED | OUTPATIENT
Start: 2023-04-24 | End: 2023-04-24

## 2023-04-24 RX ORDER — DOCUSATE SODIUM 100 MG/1
100 CAPSULE, LIQUID FILLED ORAL 2 TIMES DAILY
Status: DISCONTINUED | OUTPATIENT
Start: 2023-04-25 | End: 2023-04-25

## 2023-04-24 RX ORDER — SODIUM CHLORIDE 0.9 % (FLUSH) 0.9 %
5-40 SYRINGE (ML) INJECTION PRN
Status: DISCONTINUED | OUTPATIENT
Start: 2023-04-24 | End: 2023-04-26 | Stop reason: HOSPADM

## 2023-04-24 RX ORDER — POTASSIUM CHLORIDE 20 MEQ/1
40 TABLET, EXTENDED RELEASE ORAL PRN
Status: DISCONTINUED | OUTPATIENT
Start: 2023-04-24 | End: 2023-04-26 | Stop reason: HOSPADM

## 2023-04-24 RX ORDER — ACETAMINOPHEN 325 MG/1
650 TABLET ORAL EVERY 4 HOURS PRN
Status: DISCONTINUED | OUTPATIENT
Start: 2023-04-24 | End: 2023-04-26 | Stop reason: HOSPADM

## 2023-04-24 RX ADMIN — BUSPIRONE HYDROCHLORIDE 5 MG: 5 TABLET ORAL at 22:57

## 2023-04-24 RX ADMIN — SODIUM CHLORIDE, POTASSIUM CHLORIDE, SODIUM LACTATE AND CALCIUM CHLORIDE 1000 ML: 600; 310; 30; 20 INJECTION, SOLUTION INTRAVENOUS at 20:41

## 2023-04-24 RX ADMIN — DIPHENHYDRAMINE HYDROCHLORIDE 50 MG: 50 INJECTION, SOLUTION INTRAMUSCULAR; INTRAVENOUS at 20:48

## 2023-04-24 RX ADMIN — DOCUSATE SODIUM 300 MG: 100 CAPSULE, LIQUID FILLED ORAL at 22:57

## 2023-04-24 RX ADMIN — SODIUM PHOSPHATE 1 ENEMA: 7; 19 ENEMA RECTAL at 20:47

## 2023-04-24 RX ADMIN — KETAMINE HYDROCHLORIDE 200 MG: 50 INJECTION INTRAMUSCULAR; INTRAVENOUS at 20:30

## 2023-04-24 RX ADMIN — Medication 5 MG: at 22:57

## 2023-04-24 NOTE — PROGRESS NOTES
[address] [facility]  [address] [facility] [address] [address]  Dept: [phone]  Dept Fax: [phone]  Loc: [phone]    Elida Serrano is a 29 y.o. female who presents today for her medical conditions/complaints as noted below. Elida Serrano is c/o of Other (Passing blood )        HPI:   She presents with her foster mother that states she has been passing blood since Saturday. Jennifer Lopez mother states she is unsure if blood is coming from vagina or rectum. She states her daughter is on Depo injection and does not have menstrual periods. She has a history of constipation. She has been put on Probiotics. Jennifer Lopez mother states she passing the blood every time she using the restroom. HPI   Chief Complaint   Patient presents with    Other     Passing blood      Past Medical History:   Diagnosis Date    Autism     History of underactive thyroid       Past Surgical History:   Procedure Laterality Date    BREAST BIOPSY      Seveal years ago    OTHER SURGICAL HISTORY  7/8/15    fecal disempaction       Vitals 4/25/2023 4/24/2023 4/24/2023 4/24/2023 4/24/2023 7/61/5020   SYSTOLIC - 607 552 403 625 564   DIASTOLIC - 86 79 86 96 91   Site - - - - - -   Position - - - - - -   Cuff Size - - - - - -   Pulse - 91 - 109 116 104   Temp - 97.3 - - - -   Resp - 18 - 18 16 22   SpO2 98 99 100 99 98 96   Weight 125 lb 4.8 oz - - - - -   Height - - - - - -   Body Mass Index 0 kg/m2 - - - - -   Some recent data might be hidden       Family History   Problem Relation Age of Onset    Cancer Father     Cancer Maternal Grandfather         ? Social History     Tobacco Use    Smoking status: Never    Smokeless tobacco: Never   Substance Use Topics    Alcohol use: No     Alcohol/week: 0.0 standard drinks      No current facility-administered medications on file prior to visit.      Current Outpatient Medications on File Prior to Visit   Medication Sig Dispense Refill    busPIRone

## 2023-04-25 LAB
ALBUMIN SERPL-MCNC: 4.1 G/DL (ref 3.5–5.2)
ALP SERPL-CCNC: 99 U/L (ref 35–104)
ALT SERPL-CCNC: 9 U/L (ref 5–33)
ANION GAP SERPL CALCULATED.3IONS-SCNC: 13 MMOL/L (ref 7–19)
AST SERPL-CCNC: 13 U/L (ref 5–32)
BASOPHILS # BLD: 0.1 K/UL (ref 0–0.2)
BASOPHILS NFR BLD: 0.8 % (ref 0–1)
BILIRUB SERPL-MCNC: <0.2 MG/DL (ref 0.2–1.2)
BUN SERPL-MCNC: 11 MG/DL (ref 6–20)
CALCIUM SERPL-MCNC: 9 MG/DL (ref 8.6–10)
CHLORIDE SERPL-SCNC: 108 MMOL/L (ref 98–111)
CO2 SERPL-SCNC: 22 MMOL/L (ref 22–29)
CREAT SERPL-MCNC: 0.7 MG/DL (ref 0.5–0.9)
EOSINOPHIL # BLD: 0.1 K/UL (ref 0–0.6)
EOSINOPHIL NFR BLD: 0.8 % (ref 0–5)
ERYTHROCYTE [DISTWIDTH] IN BLOOD BY AUTOMATED COUNT: 12.6 % (ref 11.5–14.5)
GLUCOSE SERPL-MCNC: 87 MG/DL (ref 74–109)
HCT VFR BLD AUTO: 39.1 % (ref 37–47)
HGB BLD-MCNC: 12.5 G/DL (ref 12–16)
IMM GRANULOCYTES # BLD: 0.1 K/UL
LYMPHOCYTES # BLD: 2.1 K/UL (ref 1.1–4.5)
LYMPHOCYTES NFR BLD: 28.6 % (ref 20–40)
MCH RBC QN AUTO: 31.1 PG (ref 27–31)
MCHC RBC AUTO-ENTMCNC: 32 G/DL (ref 33–37)
MCV RBC AUTO: 97.3 FL (ref 81–99)
MONOCYTES # BLD: 0.8 K/UL (ref 0–0.9)
MONOCYTES NFR BLD: 10.5 % (ref 0–10)
NEUTROPHILS # BLD: 4.3 K/UL (ref 1.5–7.5)
NEUTS SEG NFR BLD: 58.2 % (ref 50–65)
PLATELET # BLD AUTO: 278 K/UL (ref 130–400)
PMV BLD AUTO: 10.1 FL (ref 9.4–12.3)
POTASSIUM SERPL-SCNC: 4.1 MMOL/L (ref 3.5–5)
PROT SERPL-MCNC: 6.6 G/DL (ref 6.6–8.7)
RBC # BLD AUTO: 4.02 M/UL (ref 4.2–5.4)
SODIUM SERPL-SCNC: 143 MMOL/L (ref 136–145)
WBC # BLD AUTO: 7.5 K/UL (ref 4.8–10.8)

## 2023-04-25 PROCEDURE — 94760 N-INVAS EAR/PLS OXIMETRY 1: CPT

## 2023-04-25 PROCEDURE — 2580000003 HC RX 258: Performed by: HOSPITALIST

## 2023-04-25 PROCEDURE — 6370000000 HC RX 637 (ALT 250 FOR IP): Performed by: STUDENT IN AN ORGANIZED HEALTH CARE EDUCATION/TRAINING PROGRAM

## 2023-04-25 PROCEDURE — 85025 COMPLETE CBC W/AUTO DIFF WBC: CPT

## 2023-04-25 PROCEDURE — 6360000002 HC RX W HCPCS: Performed by: HOSPITALIST

## 2023-04-25 PROCEDURE — 80053 COMPREHEN METABOLIC PANEL: CPT

## 2023-04-25 PROCEDURE — 36415 COLL VENOUS BLD VENIPUNCTURE: CPT

## 2023-04-25 PROCEDURE — 99222 1ST HOSP IP/OBS MODERATE 55: CPT | Performed by: INTERNAL MEDICINE

## 2023-04-25 PROCEDURE — 6370000000 HC RX 637 (ALT 250 FOR IP): Performed by: INTERNAL MEDICINE

## 2023-04-25 PROCEDURE — 6370000000 HC RX 637 (ALT 250 FOR IP): Performed by: HOSPITALIST

## 2023-04-25 PROCEDURE — G0378 HOSPITAL OBSERVATION PER HR: HCPCS

## 2023-04-25 RX ORDER — POLYETHYLENE GLYCOL 3350 17 G/17G
17 POWDER, FOR SOLUTION ORAL DAILY
Status: DISCONTINUED | OUTPATIENT
Start: 2023-04-25 | End: 2023-04-26 | Stop reason: HOSPADM

## 2023-04-25 RX ORDER — SENNA PLUS 8.6 MG/1
1 TABLET ORAL 2 TIMES DAILY
Status: DISCONTINUED | OUTPATIENT
Start: 2023-04-25 | End: 2023-04-26 | Stop reason: HOSPADM

## 2023-04-25 RX ADMIN — ENOXAPARIN SODIUM 40 MG: 100 INJECTION SUBCUTANEOUS at 08:49

## 2023-04-25 RX ADMIN — POLYETHYLENE GLYCOL-3350 AND ELECTROLYTES 4000 ML: 236; 6.74; 5.86; 2.97; 22.74 POWDER, FOR SOLUTION ORAL at 18:03

## 2023-04-25 RX ADMIN — SODIUM CHLORIDE, PRESERVATIVE FREE 10 ML: 5 INJECTION INTRAVENOUS at 20:15

## 2023-04-25 RX ADMIN — SENNOSIDES 8.6 MG: 8.6 TABLET, FILM COATED ORAL at 20:13

## 2023-04-25 RX ADMIN — LEVOTHYROXINE SODIUM 25 MCG: 25 TABLET ORAL at 05:25

## 2023-04-25 RX ADMIN — SODIUM CHLORIDE, PRESERVATIVE FREE 10 ML: 5 INJECTION INTRAVENOUS at 09:00

## 2023-04-25 RX ADMIN — DOCUSATE SODIUM 100 MG: 100 CAPSULE, LIQUID FILLED ORAL at 08:49

## 2023-04-25 RX ADMIN — BUSPIRONE HYDROCHLORIDE 5 MG: 5 TABLET ORAL at 08:49

## 2023-04-25 RX ADMIN — BUSPIRONE HYDROCHLORIDE 5 MG: 5 TABLET ORAL at 20:13

## 2023-04-25 RX ADMIN — POLYETHYLENE GLYCOL 3350 17 G: 17 POWDER, FOR SOLUTION ORAL at 11:44

## 2023-04-25 RX ADMIN — Medication 5 MG: at 20:13

## 2023-04-25 ASSESSMENT — ENCOUNTER SYMPTOMS
ALLERGIC/IMMUNOLOGIC NEGATIVE: 1
RESPIRATORY NEGATIVE: 1
BLOOD IN STOOL: 1
SHORTNESS OF BREATH: 0
NAUSEA: 0
COUGH: 0
EYES NEGATIVE: 1
ABDOMINAL PAIN: 0

## 2023-04-25 NOTE — TELEPHONE ENCOUNTER
From: Alejandra Almendarez  To: Dr. Rodriguez Calles: 4/24/2023 4:35 PM CDT  Subject: Epi Shin seen by Beatrice Cordon today. CT results I need instructions as to what we need to do to solve this issues? Study result  Impression  Impression:    Large amount of stool is present within the rectosigmoid colon. Rectum is  markedly distended to approximately 10 cm in transverse dimension. Moderate  stool and air are seen elsewhere within the colon. Findings are concerning for  fecal impaction with constipation. Borup fluid density structure projecting to the right mid abdomen is thought to  represent a moderately distended urinary bladder, displaced by the markedly  distended rectum. Narrative  Examination: CT of the abdomen and pelvis without IV contrast  Clinical history: Fresh blood passed per rectum  Comparison: None  Technique: Helical CT imaging of the abdomen and pelvis was performed without IV  contrast.Images were reformatted in the axial, sagittal, and coronal planes. Findings:  fluid density structure projecting to the right mid abdomen  is thought to represent a moderately distended urinary bladder. Large amount of stool is present within the rectosigmoid colon. Rectum is  markedly distended to approximately 10 cm in transverse dimension. Moderate  stool and air are seen elsewhere within the colon. Further evaluation of the  hollow viscera is limited due to limited field-of-view, patient motion, and lack  of IV contrast administration. No free air is identified. Abdominal aorta is grossly unremarkable. No acute osseous lesion is identified. Superficial soft tissues demonstrate no  acute abnormality.

## 2023-04-25 NOTE — PLAN OF CARE
Problem: Discharge Planning  Goal: Discharge to home or other facility with appropriate resources  Outcome: Progressing  Flowsheets  Taken 4/24/2023 2310  Discharge to home or other facility with appropriate resources: Identify barriers to discharge with patient and caregiver  Taken 4/24/2023 2306  Discharge to home or other facility with appropriate resources:   Identify barriers to discharge with patient and caregiver   Identify discharge learning needs (meds, wound care, etc)   Refer to discharge planning if patient needs post-hospital services based on physician order or complex needs related to functional status, cognitive ability or social support system   Arrange for needed discharge resources and transportation as appropriate     Problem: ABCDS Injury Assessment  Goal: Absence of physical injury  Outcome: Progressing

## 2023-04-25 NOTE — H&P
[id number] [facility]      Hospitalist - History & Physical      PCP: Erik Oviedo MD    Date of Admission: 4/24/2023    Date of Service: 4/24/2023    Chief Complaint:  Fecal impaction    History Of Present Illness: The patient is a 29 y.o. female who presented to [facility] ED for evaluation of rectal bleeding. Pt has history of hypothyrodism, autism and chronic constipation. Pt is here with her foster mom who provides history of present illness. She tells me that patient has chronic problems with constipation treated with dulcolax and lactulose. Pt had blood noted in toilet this morning having been evaluated at pcp office sent here for treatment of fecal impaction noted on CTof abd/pelvis performed as outpatient. Pt has had no fevers, nausea or vomiting. In ED, procedural sedation was provided as well as fecal disimpaction. No blood was noted with hemorrhoids present per ED physician's exam. Wbc 6k, hgb 13, platelets 871, negative pregnancy test, UA moderate leukocyte esterase, negative urine nitrite-31-50 wbc, sodium 140, potassium 4.1, creatinine 0.7/bun 16, glucose 98. Pt is placed in observation for further evaluation by hospitalist.       Past Medical History:        Diagnosis Date    History of underactive thyroid        Past Surgical History:        Procedure Laterality Date    BREAST BIOPSY      Seveal years ago    OTHER SURGICAL HISTORY  7/8/15    fecal disempaction       Home Medications:  Prior to Admission medications    Medication Sig Start Date End Date Taking?  Authorizing Provider   busPIRone (BUSPAR) 5 MG tablet TAKE ONE TABLET BY MOUTH TWICE A DAY 3/7/23   Mishel Cruz MD   levothyroxine (SYNTHROID) 25 MCG tablet TAKE ONE TABLET BY MOUTH DAILY. 1/9/23   Fely Montez MD   lactulose (CHRONULAC) 10 GM/15ML solution Take 30 mL every 2 hrs up 3 times in 24 hrs until large BM produced 12/28/22   Fely Montez MD   Incontinence Supply Disposable (A + D PERSONAL CARE WIPES) MISC Use as

## 2023-04-25 NOTE — CONSULTS
Pt Name: Ana Mejia  MRN: [id number]  [id number]  YOB: 1990  Admit Date: 4/24/2023  7:15 PM  Date of evaluation: 4/25/2023  Primary Care Physician: Alvin Lund MD   [id number]       Requesting Provider: Alejandra Ferrari MD    GI Consult    Indication: Chronic constipation. Abdominal pain. Rectal bleeding. History:  The patient is a 29 y.o. female admitted to the hospital for rectal bleeding, abdominal pain and chronic constipation. Patient is autistic and no history is available from her. Most of the history is available from the chart and from patient's foster caregiver. Apparently she has been constipated for a long time. According to the caregiver she does not push to move her bowel. She just refuses to push. She was given multiple laxatives including Dulcolax MiraLAX and lactulose without much benefit. She passed small amount of stool. She has get significant abdominal bloating and distention. Yesterday she had a bowel movement which was small followed by a mild to moderate quantity of fresh blood per rectum. She was also complaining of some abdominal pain and discomfort. No heartburns or regurgitation. No nausea or vomiting. She eat pretty normal.  No weight loss. Imaging study revealed significant impaction of the stool. In the ER yesterday under sedation large amount of stool were disimpacted. She was given enemas and last night she has large amount of bowel movement. No further bleeding was noted. She never had a colonoscopy done. Past Medical History:        Diagnosis Date    Autism     History of underactive thyroid      Past Surgical History:        Procedure Laterality Date    BREAST BIOPSY      Seveal years ago    OTHER SURGICAL HISTORY  7/8/15    fecal disempaction     Allergies:  Latex, Betadine [povidone iodine], and Sulfa antibiotics  Home Meds:  Prior to Admission medications    Medication Sig Start Date End Date Taking?  Authorizing Provider

## 2023-04-25 NOTE — PLAN OF CARE
SUBJECTIVE:    Mrs. Janette Mendez is a 29year old  american lady from a foster home who presented for \"Abdominal Pain, Rectal Bleeding\" as per her triage ED RN. Her EP related that she was the most impacted patient he has ever seen. He provided her moderate sedation and disimpacted as much as he was able. He states that there was stool that was too high for him to evacuate. She has been referred to the Hospitalist service for a brief stay where we can try to medically manage her and clear her impaction. Gastroenterology has also been consulted, she has a 10cm in dilation transverse colon. OBJECTIVE:    BP (!) 149/86   Pulse (!) 109   Temp 97.8 °F (36.6 °C) (Oral)   Resp 18   Wt 115 lb (52.2 kg)   SpO2 99%   BMI 20.37 kg/m²     Narrative   Examination: CT of the abdomen and pelvis without IV contrast   Clinical history: Fresh blood passed per rectum   Comparison: None   Technique: Helical CT imaging of the abdomen and pelvis was performed without IV   contrast.Images were reformatted in the axial, sagittal, and coronal planes. Findings:   Limited examination which excludes portion of the upper abdomen due to patient   moving between  and scanning. Examination is also limited due to patient   motion and due to lack of IV contrast administration. The technologist reports   that these are the best images attainable. Visualized portion of the liver is grossly unremarkable. Spleen is incompletely   imaged but appears grossly unremarkable. The adrenal glands are grossly   unremarkable. Kidneys are grossly unremarkable. No hydronephrosis is seen. No   urinary tract calculi are definitely identified. The ureters are not well   visualized. [place] fluid density structure projecting to the right mid abdomen   is thought to represent a moderately distended urinary bladder. Large amount of stool is present within the rectosigmoid colon. Rectum is   markedly distended to approximately 10 cm in

## 2023-04-25 NOTE — TELEPHONE ENCOUNTER
We called yesterday evening after results come in and spoke with patients caregiver. She stated that Denman Duane takes two Doculax a day and is also on Lactulose. She also stated since patient was home from being seen in office today she passed more blood and clots. She asked if [facility] could admit her so she could be cleaned out due to results. I advised to caregiver we were not able to do and advised it would be recommended to take her through the emergency room for evaluation. Also informed her to let them know her daily medication regimen, and that she had a ct scan completed today and per results we recommended she come in.

## 2023-04-25 NOTE — ED NOTES
Attempted to call report, RN unavailable. States will call when available.      Tasha Mcdowell RN  04/24/23 8798

## 2023-04-25 NOTE — ED PROVIDER NOTES
Behavior: Behavior normal.         Thought Content: Thought content normal.         Judgment: Judgment normal.       DIAGNOSTIC RESULTS     EKG: All EKG's are interpreted by the Emergency Department Physician who either signs or Co-signs this chart in the absence of a cardiologist.        RADIOLOGY:   Non-plain film images such as CT, Ultrasound and MRI are read by the radiologist. Katie Fong images are visualized and preliminarily interpreted by the emergency physician with the below findings:      Interpretation per the Radiologist below, if available at the time of this note:    No orders to display         ED BEDSIDE ULTRASOUND:   Performed by ED Physician - none    LABS:  Labs Reviewed   CBC WITH AUTO DIFFERENTIAL - Abnormal; Notable for the following components:       Result Value    MCHC 32.1 (*)     Neutrophils % 48.6 (*)     Monocytes % 10.9 (*)     All other components within normal limits   COMPREHENSIVE METABOLIC PANEL - Abnormal; Notable for the following components:    Alkaline Phosphatase 117 (*)     All other components within normal limits   URINALYSIS WITH REFLEX TO CULTURE - Abnormal; Notable for the following components:    Clarity, UA CLOUDY (*)     Blood, Urine MODERATE (*)     Leukocyte Esterase, Urine MODERATE (*)     All other components within normal limits   MICROSCOPIC URINALYSIS - Abnormal; Notable for the following components:    WBC, UA 31-50 (*)     All other components within normal limits   COVID-19, RAPID   CULTURE, URINE   HCG, SERUM, QUALITATIVE   LIPASE   COMPREHENSIVE METABOLIC PANEL W/ REFLEX TO MG FOR LOW K   CBC WITH AUTO DIFFERENTIAL       All other labs were within normal range or not returned as of this dictation.     Medications   ketamine (KETALAR) injection 50 mg ( IntraVENous Canceled Entry 4/24/23 2030)   lactated ringers bolus (1,000 mLs IntraVENous New Bag 4/24/23 2041)   levothyroxine (SYNTHROID) tablet 25 mcg (has no administration in time range)

## 2023-04-25 NOTE — ED NOTES
Procedure consent completed and signed by [facility] caregiver and provider.       Cornell Sandifer, RN  04/24/23 2017

## 2023-04-26 ENCOUNTER — ANESTHESIA (OUTPATIENT)
Dept: ENDOSCOPY | Age: 33
DRG: 394 | End: 2023-04-26
Payer: MEDICARE

## 2023-04-26 ENCOUNTER — ANESTHESIA EVENT (OUTPATIENT)
Dept: ENDOSCOPY | Age: 33
DRG: 394 | End: 2023-04-26
Payer: MEDICARE

## 2023-04-26 VITALS
OXYGEN SATURATION: 98 % | BODY MASS INDEX: 22.2 KG/M2 | RESPIRATION RATE: 24 BRPM | SYSTOLIC BLOOD PRESSURE: 138 MMHG | WEIGHT: 125.3 LBS | TEMPERATURE: 97.2 F | HEART RATE: 98 BPM | DIASTOLIC BLOOD PRESSURE: 76 MMHG

## 2023-04-26 PROBLEM — R10.84 GENERALIZED ABDOMINAL PAIN: Status: ACTIVE | Noted: 2023-04-26

## 2023-04-26 PROBLEM — R14.0 BLOATING: Status: ACTIVE | Noted: 2023-04-26

## 2023-04-26 PROBLEM — K62.5 RECTAL BLEEDING: Status: ACTIVE | Noted: 2023-04-24

## 2023-04-26 PROBLEM — K59.01 SLOW TRANSIT CONSTIPATION: Status: ACTIVE | Noted: 2023-04-26

## 2023-04-26 PROBLEM — K56.41 FECAL IMPACTION (HCC): Status: ACTIVE | Noted: 2023-04-26

## 2023-04-26 LAB — BACTERIA UR CULT: NORMAL

## 2023-04-26 PROCEDURE — 2500000003 HC RX 250 WO HCPCS: Performed by: NURSE ANESTHETIST, CERTIFIED REGISTERED

## 2023-04-26 PROCEDURE — 7100000000 HC PACU RECOVERY - FIRST 15 MIN: Performed by: INTERNAL MEDICINE

## 2023-04-26 PROCEDURE — 94760 N-INVAS EAR/PLS OXIMETRY 1: CPT

## 2023-04-26 PROCEDURE — 6370000000 HC RX 637 (ALT 250 FOR IP): Performed by: STUDENT IN AN ORGANIZED HEALTH CARE EDUCATION/TRAINING PROGRAM

## 2023-04-26 PROCEDURE — 6370000000 HC RX 637 (ALT 250 FOR IP): Performed by: HOSPITALIST

## 2023-04-26 PROCEDURE — 3700000001 HC ADD 15 MINUTES (ANESTHESIA): Performed by: INTERNAL MEDICINE

## 2023-04-26 PROCEDURE — 2580000003 HC RX 258: Performed by: NURSE ANESTHETIST, CERTIFIED REGISTERED

## 2023-04-26 PROCEDURE — 0DJD8ZZ INSPECTION OF LOWER INTESTINAL TRACT, VIA NATURAL OR ARTIFICIAL OPENING ENDOSCOPIC: ICD-10-PCS | Performed by: INTERNAL MEDICINE

## 2023-04-26 PROCEDURE — 7100000001 HC PACU RECOVERY - ADDTL 15 MIN: Performed by: INTERNAL MEDICINE

## 2023-04-26 PROCEDURE — 3609027000 HC COLONOSCOPY: Performed by: INTERNAL MEDICINE

## 2023-04-26 PROCEDURE — 3700000000 HC ANESTHESIA ATTENDED CARE: Performed by: INTERNAL MEDICINE

## 2023-04-26 PROCEDURE — 2580000003 HC RX 258: Performed by: HOSPITALIST

## 2023-04-26 PROCEDURE — 1210000000 HC MED SURG R&B

## 2023-04-26 PROCEDURE — 6360000002 HC RX W HCPCS: Performed by: NURSE ANESTHETIST, CERTIFIED REGISTERED

## 2023-04-26 PROCEDURE — 2709999900 HC NON-CHARGEABLE SUPPLY: Performed by: INTERNAL MEDICINE

## 2023-04-26 RX ORDER — SODIUM CHLORIDE 9 MG/ML
INJECTION, SOLUTION INTRAVENOUS PRN
Status: CANCELLED | OUTPATIENT
Start: 2023-04-26

## 2023-04-26 RX ORDER — SODIUM CHLORIDE 0.9 % (FLUSH) 0.9 %
5-40 SYRINGE (ML) INJECTION PRN
Status: CANCELLED | OUTPATIENT
Start: 2023-04-26

## 2023-04-26 RX ORDER — PROPOFOL 10 MG/ML
INJECTION, EMULSION INTRAVENOUS PRN
Status: DISCONTINUED | OUTPATIENT
Start: 2023-04-26 | End: 2023-04-26 | Stop reason: SDUPTHER

## 2023-04-26 RX ORDER — SODIUM CHLORIDE 9 MG/ML
INJECTION, SOLUTION INTRAVENOUS PRN
Status: DISCONTINUED | OUTPATIENT
Start: 2023-04-26 | End: 2023-04-26 | Stop reason: HOSPADM

## 2023-04-26 RX ORDER — ONDANSETRON 2 MG/ML
4 INJECTION INTRAMUSCULAR; INTRAVENOUS
Status: CANCELLED | OUTPATIENT
Start: 2023-04-26 | End: 2023-04-27

## 2023-04-26 RX ORDER — LIDOCAINE HYDROCHLORIDE 10 MG/ML
INJECTION, SOLUTION INFILTRATION; PERINEURAL PRN
Status: DISCONTINUED | OUTPATIENT
Start: 2023-04-26 | End: 2023-04-26 | Stop reason: SDUPTHER

## 2023-04-26 RX ORDER — SODIUM CHLORIDE 0.9 % (FLUSH) 0.9 %
5-40 SYRINGE (ML) INJECTION EVERY 12 HOURS SCHEDULED
Status: DISCONTINUED | OUTPATIENT
Start: 2023-04-26 | End: 2023-04-26 | Stop reason: HOSPADM

## 2023-04-26 RX ORDER — HYDROMORPHONE HYDROCHLORIDE 1 MG/ML
0.25 INJECTION, SOLUTION INTRAMUSCULAR; INTRAVENOUS; SUBCUTANEOUS EVERY 5 MIN PRN
Status: CANCELLED | OUTPATIENT
Start: 2023-04-26

## 2023-04-26 RX ORDER — HYDROMORPHONE HYDROCHLORIDE 1 MG/ML
0.5 INJECTION, SOLUTION INTRAMUSCULAR; INTRAVENOUS; SUBCUTANEOUS EVERY 5 MIN PRN
Status: CANCELLED | OUTPATIENT
Start: 2023-04-26

## 2023-04-26 RX ORDER — SODIUM CHLORIDE 0.9 % (FLUSH) 0.9 %
5-40 SYRINGE (ML) INJECTION PRN
Status: DISCONTINUED | OUTPATIENT
Start: 2023-04-26 | End: 2023-04-26 | Stop reason: HOSPADM

## 2023-04-26 RX ORDER — SODIUM CHLORIDE 0.9 % (FLUSH) 0.9 %
5-40 SYRINGE (ML) INJECTION EVERY 12 HOURS SCHEDULED
Status: CANCELLED | OUTPATIENT
Start: 2023-04-26

## 2023-04-26 RX ORDER — SODIUM CHLORIDE, SODIUM LACTATE, POTASSIUM CHLORIDE, CALCIUM CHLORIDE 600; 310; 30; 20 MG/100ML; MG/100ML; MG/100ML; MG/100ML
INJECTION, SOLUTION INTRAVENOUS CONTINUOUS
Status: DISCONTINUED | OUTPATIENT
Start: 2023-04-26 | End: 2023-04-26 | Stop reason: HOSPADM

## 2023-04-26 RX ORDER — SODIUM CHLORIDE, SODIUM LACTATE, POTASSIUM CHLORIDE, CALCIUM CHLORIDE 600; 310; 30; 20 MG/100ML; MG/100ML; MG/100ML; MG/100ML
INJECTION, SOLUTION INTRAVENOUS CONTINUOUS PRN
Status: DISCONTINUED | OUTPATIENT
Start: 2023-04-26 | End: 2023-04-26 | Stop reason: SDUPTHER

## 2023-04-26 RX ORDER — POLYETHYLENE GLYCOL 3350 17 G/17G
17 POWDER, FOR SOLUTION ORAL DAILY
Qty: 527 G | Refills: 1 | Status: SHIPPED | OUTPATIENT
Start: 2023-04-27 | End: 2023-06-28

## 2023-04-26 RX ADMIN — LEVOTHYROXINE SODIUM 25 MCG: 25 TABLET ORAL at 06:14

## 2023-04-26 RX ADMIN — SODIUM CHLORIDE, PRESERVATIVE FREE 10 ML: 5 INJECTION INTRAVENOUS at 08:37

## 2023-04-26 RX ADMIN — LIDOCAINE HYDROCHLORIDE 40 MG: 10 INJECTION, SOLUTION INFILTRATION; PERINEURAL at 15:11

## 2023-04-26 RX ADMIN — PROPOFOL 100 MG: 10 INJECTION, EMULSION INTRAVENOUS at 15:11

## 2023-04-26 RX ADMIN — POLYETHYLENE GLYCOL 3350 17 G: 17 POWDER, FOR SOLUTION ORAL at 08:37

## 2023-04-26 RX ADMIN — SODIUM CHLORIDE, SODIUM LACTATE, POTASSIUM CHLORIDE, AND CALCIUM CHLORIDE: 600; 310; 30; 20 INJECTION, SOLUTION INTRAVENOUS at 15:01

## 2023-04-26 RX ADMIN — SENNOSIDES 8.6 MG: 8.6 TABLET, FILM COATED ORAL at 08:37

## 2023-04-26 RX ADMIN — BUSPIRONE HYDROCHLORIDE 5 MG: 5 TABLET ORAL at 08:37

## 2023-04-26 ASSESSMENT — LIFESTYLE VARIABLES: SMOKING_STATUS: 0

## 2023-04-26 ASSESSMENT — ENCOUNTER SYMPTOMS
NAUSEA: 0
COUGH: 0
ABDOMINAL PAIN: 0
SHORTNESS OF BREATH: 0

## 2023-04-26 NOTE — ANESTHESIA POSTPROCEDURE EVALUATION
Department of Anesthesiology  Postprocedure Note    Patient: Timi Ngo  MRN: [id number]  YOB: 1990  Date of evaluation: 4/26/2023      Procedure Summary     Date: 04/26/23 Room / Location: [address]    Anesthesia Start: 1501 Anesthesia Stop: 5371    Procedure: COLONOSCOPY DIAGNOSTIC Diagnosis:       Rectal bleeding      (Rectal bleeding. Constipation. Abdominal pain and bloating. Abnormal imaging study.)    Surgeons: Kingsley Marcial MD Responsible Provider: ASAD Galvan CRNA    Anesthesia Type: general, TIVA ASA Status: 3          Anesthesia Type: No value filed.     Caty Phase I: Caty Score: 10    Caty Phase II:        Anesthesia Post Evaluation    Patient location during evaluation: bedside  Patient participation: complete - patient participated  Level of consciousness: sleepy but conscious  Pain score: 0  Airway patency: patent  Nausea & Vomiting: no nausea and no vomiting  Complications: no  Cardiovascular status: hemodynamically stable and blood pressure returned to baseline  Respiratory status: acceptable and nasal cannula  Hydration status: stable

## 2023-04-26 NOTE — DISCHARGE SUMMARY
problems. *     Fecal impaction  -- Disimpacted in ED, had another bowel movement afterwards  -- Given enema without much output  -- Change Colace to Senokot BID while inpatient  -- Add Miralax PRN  -- Plan to discontinue lactulose at discharge as she does not tolerate well  -- GI consulted  -- Will discharge on Linzess + Miralax     Rectal bleeding  -- Likely hemorrhoidal, related to impaction  -- Hgb 13.4 > 12.5  -- GI following  -- Plan for Colonoscopy on 4/26     Hypothyroidism  -- Continue home Synthroid     Anxiety  -- Continue home Buspar    Last dated Assessment and Plan . .. 4/26/23        OBJECTIVE:  /76   Pulse 98   Temp 97.2 °F (36.2 °C) (Temporal)   Resp 24   Wt 125 lb 4.8 oz (56.8 kg)   SpO2 98%   BMI 22.20 kg/m²       Heart: RRR   Lungs: Bilateral fair air entry   Abdomen: Soft, non-tender   Extremities: No edema   Neurologic: Alert and oriented   Skin: Warm and dry          Laboratory Data:  Recent Labs     04/24/23 1929 04/25/23  0235   WBC 6.4 7.5   HGB 13.4 12.5    278     Recent Labs     04/24/23 1929 04/25/23  0235    143   K 4.1 4.1    108   CO2 24 22   BUN 16 11   CREATININE 0.7 0.7   GLUCOSE 98 87     Recent Labs     04/24/23 1929 04/25/23  0235   AST 15 13   ALT 9 9   BILITOT <0.2 <0.2   ALKPHOS 117* 99     Troponin T: No results for input(s): TROPONINI in the last 72 hours. Pro-BNP: No results for input(s): BNP in the last 72 hours. INR: No results for input(s): INR in the last 72 hours. UA:  Recent Labs     04/24/23 1953   COLORU YELLOW   PHUR 7.0   WBCUA 31-50*   RBCUA 11-15 3-5   CLARITYU CLOUDY*   SPECGRAV 1.009   LEUKOCYTESUR MODERATE*   UROBILINOGEN 0.2   BILIRUBINUR Negative   BLOODU MODERATE*   GLUCOSEU Negative     A1C: No results for input(s): LABA1C in the last 72 hours. ABG:No results for input(s): PHART, MRG5JPP, PO2ART, FQY3LKR, BEART, HGBAE, W4NNIJIR, CARBOXHGBART in the last 72 hours.          Impressions of imaging performed in 48 hours

## 2023-04-26 NOTE — PLAN OF CARE
Problem: Discharge Planning  Goal: Discharge to home or other facility with appropriate resources  4/26/2023 1632 by Devonte Isaac RN  Outcome: Completed  4/26/2023 1111 by Devonte Isaac RN  Outcome: Progressing     Problem: ABCDS Injury Assessment  Goal: Absence of physical injury  4/26/2023 1632 by Devonte Isaac RN  Outcome: Completed  4/26/2023 1111 by Devonte Isaac RN  Outcome: Progressing

## 2023-04-26 NOTE — CARE COORDINATION
04/26/23 1515   IMM Letter   IMM Letter given to Patient/Family/Significant other/Guardian/POA/by: SW gave letter and explained to guardian; Pt upgraded from obs to inpt status; RENO Gomes   IMM Letter date given: 04/26/23   IMM Letter time given: 1515     Electronically signed by RENO Maldonado on 4/26/2023 at 4:17 PM

## 2023-04-26 NOTE — PROGRESS NOTES
4 Eyes Skin Assessment    Imelda Moore is being assessed upon: Admission    I agree that Denisa Sanchez, RN, along with Levi Smith RN (either 2 RN's or 1 LPN and 1 RN) have performed a thorough Head to Toe Skin Assessment on the patient. ALL assessment sites listed below have been assessed. Areas assessed by both nurses:     [x]   Head, Face, and Ears   [x]   Shoulders, Back, and Chest  [x]   Arms, Elbows, and Hands   [x]   Coccyx, Sacrum, and Ischium  [x]   Legs, Feet, and Heels    Does the Patient have Skin Breakdown?  No    Chavez Prevention initiated: No  Wound Care Orders initiated: No    WO nurse consulted for Pressure Injury (Stage 3,4, Unstageable, DTI, NWPT, and Complex wounds) and New or Established Ostomies: No        Primary Nurse eSignature: Sumi Christopher RN on 4/24/2023 at 11:13 PM      Co-Signer eSignature: Electronically signed by Agustin Najera RN on 4/24/23 at 11:20 PM CDT
Daily Progress Note    Date:2023  Patient: Gary Weinstein  : 1990  WNO:[id number]  CODE:Full Code No additional code details  Luigi Melton MD    Admit Date: 2023  7:15 PM   LOS: 0 days     Chief Complaint   Patient presents with    Abdominal Pain    Rectal Bleeding     PT relative states PT had CT of abdomen today, sent to ED for possible admission based on results          Subjective: Has finished about half of the Golytely and only had 1 solid BM this morning; stated none overnight. Doing well otherwise without acute complaints. Pt's guardian would prefer she stay inpatient and try another night of prep; as she would want her stooling to  before taking her home anyway. There were 2 small pieces of solid stool in the toilet with some bright red blood. Hospital Summary: 30 yo F with congenital hypothyroidism, autism, and chronic constipation who presented to [address] [place] [address] ED due to rectal bleeding. CT abdomen/pelvis performed as outpatient showed fecal impaction so she was sent here. Noticed bright red blood on toilet paper in the AM. She was disimpacted in the ED, but still felt to have stool above the level that could be disimpacted. They did notice hemorrhoids during the disimpaction    She was admitted to hospitalist service for further management. She had an additional bowel movement after the disimpaction. Given an enema after that bowel movement without much output. GI consulted regarding impaction and rectal bleeding. Plan for colonoscopy. Review of Systems   Constitutional:  Negative for chills and fever. Respiratory:  Negative for cough and shortness of breath. Cardiovascular:  Negative for chest pain and palpitations. Gastrointestinal:  Negative for abdominal pain and nausea.      Objective:      Vital signs in last 24 hours:  Patient Vitals for the past 24 hrs:   BP Temp Temp src Pulse Resp SpO2   23 0802 -- -- -- -- -- 100 %   23 0704 112/74 97.7
Per Lois HAMMOND, pt had very large BM in ED before transferring up to 317.
Soap Suds enema given at [facility]
5.0 %    Basophils % 0.9 0.0 - 1.0 %    Neutrophils Absolute 3.1 1.5 - 7.5 K/uL    Immature Granulocytes # 0.0 K/uL    Lymphocytes Absolute 2.3 1.1 - 4.5 K/uL    Monocytes Absolute 0.70 0.00 - 0.90 K/uL    Eosinophils Absolute 0.20 0.00 - 0.60 K/uL    Basophils Absolute 0.10 0.00 - 0.20 K/uL   Comprehensive Metabolic Panel    Collection Time: 04/24/23  7:29 PM   Result Value Ref Range    Sodium 140 136 - 145 mmol/L    Potassium 4.1 3.5 - 5.0 mmol/L    Chloride 103 98 - 111 mmol/L    CO2 24 22 - 29 mmol/L    Anion Gap 13 7 - 19 mmol/L    Glucose 98 74 - 109 mg/dL    BUN 16 6 - 20 mg/dL    Creatinine 0.7 0.5 - 0.9 mg/dL    Est, Glom Filt Rate >60 >60    Calcium 9.9 8.6 - 10.0 mg/dL    Total Protein 8.2 6.6 - 8.7 g/dL    Albumin 5.0 3.5 - 5.2 g/dL    Total Bilirubin <0.2 0.2 - 1.2 mg/dL    Alkaline Phosphatase 117 (H) 35 - 104 U/L    ALT 9 5 - 33 U/L    AST 15 5 - 32 U/L   HCG Qualitative, Serum    Collection Time: 04/24/23  7:29 PM   Result Value Ref Range    hCG Qual Negative    Lipase    Collection Time: 04/24/23  7:29 PM   Result Value Ref Range    Lipase 51 13 - 60 U/L   Urinalysis with Reflex to Culture    Collection Time: 04/24/23  7:53 PM    Specimen: Urine   Result Value Ref Range    Color, UA YELLOW Straw/Yellow    Clarity, UA CLOUDY (A) Clear    Glucose, Ur Negative Negative mg/dL    Bilirubin Urine Negative Negative    Ketones, Urine Negative Negative mg/dL    Specific Gravity, UA 1.009 1.005 - 1.030    Blood, Urine MODERATE (A) Negative    pH, UA 7.0 5.0 - 8.0    Protein, UA Negative Negative mg/dL    Urobilinogen, Urine 0.2 <2.0 E.U./dL    Nitrite, Urine Negative Negative    Leukocyte Esterase, Urine MODERATE (A) Negative   Microscopic Urinalysis    Collection Time: 04/24/23  7:53 PM   Result Value Ref Range    WBC, UA 31-50 (A) 0 - 5 /HPF    RBC, UA 11-15 3-5 0 - 2 /HPF   Culture, Urine    Collection Time: 04/24/23  7:53 PM    Specimen: Urine, clean catch   Result Value Ref Range    Urine Culture,

## 2023-04-26 NOTE — PROCEDURES
Patient: Gerard Roman : 1990  Med Rec#: [id number] Acc#: [id number]   Primary Care Provider Muriel Salvador MD    Endoscopist: Bree Herr MD    Referring Provider: Muriel Salvador MD, Dr. Timur Buckner    Date of Procedure: 2023    Pre-Op Diagnosis: Rule out hemorrhoidal bleed. Rule out colonic obstruction. Post-Op Diagnosis: 1. Examination up to 20 cm only. 2.  Poor prep. Procedure: Flexible sigmoidoscopy. Indications: Rectal bleeding. Constipation. Abdominal pain and bloating. Abnormal imaging study. Anesthesia:  Sedation was administered by anesthesia who monitored the patient during the procedure. I met with Gerard Roman prior to procedure. Discussed with patient's . We discussed the procedure itself, and I have discussed the risks of endoscopy (including-- but not limited to-- pain, discomfort, bleeding potentially requiring second endoscopic procedure and/or blood transfusion, organ perforation requiring operative repair, damage to organs near the colon, infection, aspiration, cardiopulmonary/allergic reaction), benefits, indications to endoscopy. Additionally, we discussed options other than colonoscopy. All questions answered. Signed informed consent was placed on the chart. DESCRIPTION OF PROCEDURE:     A time out was performed. After written informed consent was obtained, the patient was placed in the left lateral position. Digital rectal examination was done. The videocolonoscope was then introduced in the rectum advanced under direct vision up to the 20 cm into sigmoid colon without any difficulty. Findings:   Digital rectal examination revealed, normal sphincter tone, normal mucosa and there were no masses palpable. Examination of the colon was done up to 20 cm only. Upon entering the rectum large amount of liquid and solid stool was noted.   The scope was further advanced however, the bowel prep patient was getting

## 2023-04-26 NOTE — ANESTHESIA POSTPROCEDURE EVALUATION
Department of Anesthesiology  Postprocedure Note    Patient: Dannie Adams  MRN: [id number]  YOB: 1990  Date of evaluation: 4/26/2023      Procedure Summary     Date: 04/26/23 Room / Location: [address]    Anesthesia Start: 1501 Anesthesia Stop:     Procedure: COLONOSCOPY DIAGNOSTIC Diagnosis:       Rectal bleeding      (Rectal bleeding. Constipation. Abdominal pain and bloating. Abnormal imaging study.)    Surgeons: Jose Villaseñor MD Responsible Provider: ASAD Rucker CRNA    Anesthesia Type: general, TIVA ASA Status: 3          Anesthesia Type: No value filed.     Caty Phase I: Caty Score: 10    Caty Phase II:        Anesthesia Post Evaluation    Patient location during evaluation: bedside  Patient participation: complete - patient participated  Level of consciousness: sleepy but conscious  Pain score: 0  Airway patency: patent  Nausea & Vomiting: no nausea and no vomiting  Complications: no  Cardiovascular status: hemodynamically stable and blood pressure returned to baseline  Respiratory status: acceptable and nasal cannula  Hydration status: stable

## 2023-04-26 NOTE — ANESTHESIA PRE PROCEDURE
GI/Hepatic/Renal:        (-) GERD, liver disease and no renal disease       Endo/Other:    (+) hypothyroidism::., .    (-) diabetes mellitus, hyperthyroidism               Abdominal:   (+) scaphoid          Vascular:     - DVT. Other Findings:           Anesthesia Plan      general and TIVA     ASA 3     (Patient unable to cooperate with exam, all information obtained from EMR.)  Induction: intravenous. MIPS: Postoperative opioids intended and Prophylactic antiemetics administered.                         Laurel Colindres MD   4/26/2023

## 2023-04-27 ENCOUNTER — CARE COORDINATION (OUTPATIENT)
Dept: CASE MANAGEMENT | Age: 33
End: 2023-04-27

## 2023-04-28 ENCOUNTER — CARE COORDINATION (OUTPATIENT)
Dept: CASE MANAGEMENT | Age: 33
End: 2023-04-28

## 2023-04-28 NOTE — CARE COORDINATION
Care Transitions Outreach Attempt    Call within 2 business days of discharge: Yes   Attempted to reach patient for transitions of care follow up. Unable to reach patient. Patient: Suzette Orozco Patient : 1990 MRN: [id number]    Last Discharge [address]       Date Complaint Diagnosis Description Type Department Provider    23 Abdominal Pain; Rectal Bleeding Fecal impaction ([facility] [address] . .. ED to Hosp-Admission (Discharged) (ADMITTED) [facility] Salas Johnson MD; Lyndsay Saxena . .. Was this an external facility discharge?  No Discharge Facility: NA    Noted following upcoming appointments from discharge chart review:   [facility] follow up appointment(s):   Future Appointments   Date Time Provider Jennifer Lawrence   2023 10:00 AM Mary Boyce MD [facility]   2023 11:00 AM ASAD Hamlin [facility]   2023  9:30 AM Mary Boyce MD [facility]     [facility] follow up appointment(s): NA

## 2023-05-02 ENCOUNTER — OFFICE VISIT (OUTPATIENT)
Dept: PRIMARY CARE CLINIC | Age: 33
End: 2023-05-02
Payer: MEDICARE

## 2023-05-02 VITALS
HEART RATE: 76 BPM | OXYGEN SATURATION: 96 % | HEIGHT: 63 IN | BODY MASS INDEX: 20.13 KG/M2 | WEIGHT: 113.6 LBS | DIASTOLIC BLOOD PRESSURE: 78 MMHG | SYSTOLIC BLOOD PRESSURE: 126 MMHG | TEMPERATURE: 97.6 F

## 2023-05-02 DIAGNOSIS — Z09 HOSPITAL DISCHARGE FOLLOW-UP: Primary | ICD-10-CM

## 2023-05-02 DIAGNOSIS — K56.49 IMPACTION OF COLON (HCC): ICD-10-CM

## 2023-05-02 DIAGNOSIS — K56.41 FECAL IMPACTION (HCC): ICD-10-CM

## 2023-05-02 DIAGNOSIS — F84.0 AUTISM DISORDER: Chronic | ICD-10-CM

## 2023-05-02 DIAGNOSIS — K59.09 CHRONIC CONSTIPATION: ICD-10-CM

## 2023-05-02 PROCEDURE — 1111F DSCHRG MED/CURRENT MED MERGE: CPT | Performed by: FAMILY MEDICINE

## 2023-05-02 PROCEDURE — G8427 DOCREV CUR MEDS BY ELIG CLIN: HCPCS | Performed by: FAMILY MEDICINE

## 2023-05-02 PROCEDURE — 99214 OFFICE O/P EST MOD 30 MIN: CPT | Performed by: FAMILY MEDICINE

## 2023-05-02 PROCEDURE — 1036F TOBACCO NON-USER: CPT | Performed by: FAMILY MEDICINE

## 2023-05-02 PROCEDURE — G8420 CALC BMI NORM PARAMETERS: HCPCS | Performed by: FAMILY MEDICINE

## 2023-05-02 RX ORDER — LACTOBACILLUS ACIDOPHILUS 20B CELL
CAPSULE ORAL
Qty: 30 CAPSULE | Refills: 3 | Status: SHIPPED | OUTPATIENT
Start: 2023-05-02

## 2023-05-02 RX ORDER — LACTOBACILLUS ACIDOPHILUS 20B CELL
CAPSULE ORAL
COMMUNITY
Start: 2023-04-07 | End: 2023-05-02 | Stop reason: SDUPTHER

## 2023-05-02 NOTE — PROGRESS NOTES
Post-Discharge Transitional Care  Follow Up      Harper Wall   YOB: 1990    Date of Office Visit:  5/2/2023  Date of Hospital Admission: 4/24/23  Date of Hospital Discharge: 4/26/23  Risk of hospital readmission (high >=14%. Medium >=10%) :No data recorded    Care management risk score Rising risk (score 2-5) and Complex Care (Scores >=6): No Risk Score On File     Non face to face  following discharge, date last encounter closed (first attempt may have been earlier): 04/28/2023    Call initiated 2 business days of discharge: Yes    ASSESSMENT/PLAN:   Hospital discharge follow-up  -     ND DISCHARGE MEDS RECONCILED W/ CURRENT OUTPATIENT MED LIST  Chronic constipation  Impaction of colon ([address]  Fecal impaction ([address]  Autism disorder      Medical Decision Making: moderate complexity  Return in 3 months (on 8/2/2023) for routine follow-up with me. On this date 5/2/2023 I have spent 30 minutes reviewing previous notes, test results and face to face with the patient discussing the diagnosis and importance of compliance with the treatment plan as well as documenting on the day of the visit. Subjective:   HPI:  Follow up of Hospital problems/diagnosis(es): Chronic constipation. Fecal impaction,                                                                                      Mental Retardation                                                                                      Autism disorder    Inpatient course: Discharge summary reviewed- see chart. Interval history/Current status:   Pt is brought in by her foster care giver for follow-up of recent hospitalization due to rectal bleeding. During the hospitalization, pt was fd to be severely impacted that she initially did not respond to enemas, digital removal of stool. Colonoscopy was done during this hospitalization.  Pt sent home on [employer]

## 2023-05-08 ENCOUNTER — OFFICE VISIT (OUTPATIENT)
Dept: GASTROENTEROLOGY | Age: 33
End: 2023-05-08
Payer: MEDICARE

## 2023-05-08 VITALS
WEIGHT: 113 LBS | OXYGEN SATURATION: 98 % | BODY MASS INDEX: 20.02 KG/M2 | SYSTOLIC BLOOD PRESSURE: 120 MMHG | DIASTOLIC BLOOD PRESSURE: 80 MMHG | HEART RATE: 78 BPM | HEIGHT: 63 IN

## 2023-05-08 DIAGNOSIS — R14.0 BLOATING: ICD-10-CM

## 2023-05-08 DIAGNOSIS — K59.00 CONSTIPATION, UNSPECIFIED CONSTIPATION TYPE: Primary | ICD-10-CM

## 2023-05-08 PROCEDURE — G8420 CALC BMI NORM PARAMETERS: HCPCS | Performed by: NURSE PRACTITIONER

## 2023-05-08 PROCEDURE — 1036F TOBACCO NON-USER: CPT | Performed by: NURSE PRACTITIONER

## 2023-05-08 PROCEDURE — G8427 DOCREV CUR MEDS BY ELIG CLIN: HCPCS | Performed by: NURSE PRACTITIONER

## 2023-05-08 PROCEDURE — 1111F DSCHRG MED/CURRENT MED MERGE: CPT | Performed by: NURSE PRACTITIONER

## 2023-05-08 PROCEDURE — 99214 OFFICE O/P EST MOD 30 MIN: CPT | Performed by: NURSE PRACTITIONER

## 2023-05-08 RX ORDER — POLYETHYLENE GLYCOL 3350 17 G/17G
17 POWDER, FOR SOLUTION ORAL DAILY
Qty: 527 G | Refills: 11 | Status: SHIPPED | OUTPATIENT
Start: 2023-05-08 | End: 2023-07-09

## 2023-05-08 RX ORDER — LACTOBACILLUS ACIDOPHILUS 20B CELL
CAPSULE ORAL
Qty: 30 CAPSULE | Refills: 11 | Status: SHIPPED | OUTPATIENT
Start: 2023-05-08

## 2023-05-08 RX ORDER — DOCUSATE SODIUM 100 MG/1
100 CAPSULE, LIQUID FILLED ORAL 2 TIMES DAILY
Qty: 60 CAPSULE | Refills: 11 | Status: SHIPPED | OUTPATIENT
Start: 2023-05-08 | End: 2023-07-07

## 2023-05-08 ASSESSMENT — ENCOUNTER SYMPTOMS
ABDOMINAL PAIN: 0
DIARRHEA: 0
SHORTNESS OF BREATH: 0
RECTAL PAIN: 0
CONSTIPATION: 0
VOMITING: 0
NAUSEA: 0
CHOKING: 0
TROUBLE SWALLOWING: 0
ANAL BLEEDING: 0
COUGH: 0
BLOOD IN STOOL: 0
ABDOMINAL DISTENTION: 0

## 2023-05-08 NOTE — PROGRESS NOTES
Subjective:     Patient ID: Austin Steward is a 29 y.o. female  PCP: Sharyle Rio, MD  Referring Provider: Akash Niño MD    HPI  Patient presents to the office today with the following complaints: New Patient and Constipation      Patient seen in the office today for hospital follow up following extreme constipation. She is developmentally disabled and she is here today with her guardian    Colonoscopy was attempted in the hospital but patients colon was not clean out enough to complete the colonoscopy   Her guardian reports she is now having 3-4 bowel movements per day with the use of Miralax and Linzess 290 MCG daily and Stool softener BID   Denies any rectal bleeding noted since being in the hospital     CT Result (most recent):  CT ABDOMEN PELVIS WO IV CONTRAST 04/24/2023    Narrative  Examination: CT of the abdomen and pelvis without IV contrast  Clinical history: Fresh blood passed per rectum  Comparison: None  Technique: Helical CT imaging of the abdomen and pelvis was performed without IV  contrast.Images were reformatted in the axial, sagittal, and coronal planes. Findings:  Limited examination which excludes portion of the upper abdomen due to patient  moving between  and scanning. Examination is also limited due to patient  motion and due to lack of IV contrast administration. The technologist reports  that these are the best images attainable. Visualized portion of the liver is grossly unremarkable. Spleen is incompletely  imaged but appears grossly unremarkable. The adrenal glands are grossly  unremarkable. Kidneys are grossly unremarkable. No hydronephrosis is seen. No  urinary tract calculi are definitely identified. The ureters are not well  visualized. [place] fluid density structure projecting to the right mid abdomen  is thought to represent a moderately distended urinary bladder. Large amount of stool is present within the rectosigmoid colon. Rectum is  markedly distended to approximately

## 2023-05-08 NOTE — PATIENT INSTRUCTIONS
doctor's directions about when to stop eating solid foods and drink only clear liquids. You can drink water, clear juices, clear broths, flavored ice pops, and gelatin (such as Jell-O). Do not eat or drink anything red or purple. This includes grape juice and grape-flavored ice pops. It also includes fruit punch and cherry gelatin. Drink the \"colon prep\" liquid as your doctor tells you. You will want to stay home, because the liquid will make you go to the bathroom a lot. Your stools will be loose and watery. It's very important to drink all of the liquid. If you have problems drinking it, call your doctor. Do not eat any solid foods after you drink the colon prep. Stop drinking clear liquids for a few hours before the test. Your doctor will tell you how many hours this will be. What happens on the day of the procedure? Follow the instructions exactly about when to stop eating and drinking. If you don't, your procedure may be canceled. If your doctor told you to take your medicines on the day of the procedure, take them with only a sip of water. Take a bath or shower before you come in for your procedure. Do not apply lotions, perfumes, deodorants, or nail polish. Take off all jewelry and piercings. And take out contact lenses, if you wear them. At the [address] [place] [address] or hospitals   Bring a picture ID. You will be kept comfortable and safe by your anesthesia provider. The anesthesia may make you sleep. You will lie on your back or your side with your knees drawn up toward your belly. The doctor will gently put a gloved finger into your anus. Then the doctor puts the scope in and moves it into your colon. The scope goes in easily because it is lubricated. The doctor may also use small tools to take tissue samples for a biopsy or to remove polyps. This does not hurt. The test usually takes 30 to 45 minutes. But it may take longer. It depends on what is found and what is done.

## 2023-05-10 ENCOUNTER — NURSE ONLY (OUTPATIENT)
Dept: PRIMARY CARE CLINIC | Age: 33
End: 2023-05-10

## 2023-05-10 DIAGNOSIS — Z30.019 ENCOUNTER FOR FEMALE BIRTH CONTROL: Primary | ICD-10-CM

## 2023-05-11 RX ORDER — MEDROXYPROGESTERONE ACETATE 150 MG/ML
150 INJECTION, SUSPENSION INTRAMUSCULAR ONCE
Qty: 1 ML | Refills: 3
Start: 2023-05-11 | End: 2023-05-11

## 2023-05-12 ENCOUNTER — HOSPITAL ENCOUNTER (OUTPATIENT)
Age: 33
Setting detail: OUTPATIENT SURGERY
Discharge: HOME | End: 2023-05-12
Attending: INTERNAL MEDICINE | Admitting: INTERNAL MEDICINE
Payer: MEDICARE

## 2023-05-12 ENCOUNTER — TELEPHONE (OUTPATIENT)
Dept: GASTROENTEROLOGY | Age: 33
End: 2023-05-12

## 2023-05-12 ENCOUNTER — ANESTHESIA EVENT (OUTPATIENT)
Dept: OPERATING ROOM | Age: 33
End: 2023-05-12

## 2023-05-12 ENCOUNTER — APPOINTMENT (OUTPATIENT)
Dept: OPERATING ROOM | Age: 33
End: 2023-05-12

## 2023-05-12 ENCOUNTER — ANESTHESIA (OUTPATIENT)
Dept: OPERATING ROOM | Age: 33
End: 2023-05-12

## 2023-05-12 VITALS
HEIGHT: 63 IN | HEART RATE: 65 BPM | TEMPERATURE: 97.8 F | SYSTOLIC BLOOD PRESSURE: 112 MMHG | DIASTOLIC BLOOD PRESSURE: 69 MMHG | RESPIRATION RATE: 18 BRPM | OXYGEN SATURATION: 100 % | WEIGHT: 115 LBS | BODY MASS INDEX: 20.38 KG/M2

## 2023-05-12 LAB
CONTROL: NORMAL
PREGNANCY TEST URINE, POC: NEGATIVE

## 2023-05-12 PROCEDURE — G8918 PT W/O PREOP ORDER IV AB PRO: HCPCS

## 2023-05-12 PROCEDURE — 45378 DIAGNOSTIC COLONOSCOPY: CPT

## 2023-05-12 PROCEDURE — G8907 PT DOC NO EVENTS ON DISCHARG: HCPCS

## 2023-05-12 PROCEDURE — 45378 DIAGNOSTIC COLONOSCOPY: CPT | Performed by: INTERNAL MEDICINE

## 2023-05-12 RX ORDER — LIDOCAINE HYDROCHLORIDE 10 MG/ML
INJECTION, SOLUTION EPIDURAL; INFILTRATION; INTRACAUDAL; PERINEURAL PRN
Status: DISCONTINUED | OUTPATIENT
Start: 2023-05-12 | End: 2023-05-12 | Stop reason: SDUPTHER

## 2023-05-12 RX ORDER — PROPOFOL 10 MG/ML
INJECTION, EMULSION INTRAVENOUS PRN
Status: DISCONTINUED | OUTPATIENT
Start: 2023-05-12 | End: 2023-05-12 | Stop reason: SDUPTHER

## 2023-05-12 RX ORDER — SODIUM CHLORIDE, SODIUM LACTATE, POTASSIUM CHLORIDE, CALCIUM CHLORIDE 600; 310; 30; 20 MG/100ML; MG/100ML; MG/100ML; MG/100ML
INJECTION, SOLUTION INTRAVENOUS CONTINUOUS
Status: DISCONTINUED | OUTPATIENT
Start: 2023-05-12 | End: 2023-05-12 | Stop reason: HOSPADM

## 2023-05-12 RX ADMIN — SODIUM CHLORIDE, SODIUM LACTATE, POTASSIUM CHLORIDE, CALCIUM CHLORIDE: 600; 310; 30; 20 INJECTION, SOLUTION INTRAVENOUS at 11:08

## 2023-05-12 RX ADMIN — LIDOCAINE HYDROCHLORIDE 50 MG: 10 INJECTION, SOLUTION EPIDURAL; INFILTRATION; INTRACAUDAL; PERINEURAL at 11:52

## 2023-05-12 RX ADMIN — PROPOFOL 100 MG: 10 INJECTION, EMULSION INTRAVENOUS at 11:52

## 2023-05-12 RX ADMIN — PROPOFOL 300 MG: 10 INJECTION, EMULSION INTRAVENOUS at 11:53

## 2023-05-12 ASSESSMENT — LIFESTYLE VARIABLES: SMOKING_STATUS: 0

## 2023-05-12 NOTE — OP NOTE
Patient: Austin Steward : 1990  Med Rec#: [id number] Acc#: [id number]   Primary Care Provider Sharyle Rio, MD    Date of Procedure:  2023    Endoscopist: Kavita Lanza MD, MD    Referring Provider: Sharyle Rio, MD,     Operation Performed: Colonoscopy-despite this being the second attempt in the last few weeks, unsuccessful and incomplete attempt up to the rectosigmoid junction only due to very poor prep with large amounts of solid stool occluding the colon lumen rendering any further passage of the endoscope tip impossible. Indications:     1. Chronic constipation, unspecified constipation type  2. Bloating  3. Hx of rectal bleeding      Anesthesia:  Sedation was administered by anesthesia who monitored the patient during the procedure. I met with Austin Steward prior to procedure. We discussed the procedure itself, and I have discussed the risks of endoscopy (including-- but not limited to-- pain, discomfort, bleeding potentially requiring second endoscopic procedure and/or blood transfusion, organ perforation requiring operative repair, damage to organs near the colon, infection, aspiration, cardiopulmonary/allergic reaction), benefits, indications to endoscopy. Additionally, we discussed options other than colonoscopy. The patient expressed understanding. All questions answered. The patient decided to proceed with the procedure. Signed informed consent was placed on the chart. Blood Loss: minimal    Withdrawal time: Not applicable since procedure was incomplete and had to be abandoned prematurely due to poor prep  Bowel Prep: Very poor with large amounts of stool noted within the rectum and rectosigmoid area preventing any further passage of the endoscope tip    Complications: no immediate complications    DESCRIPTION OF PROCEDURE:     A time out was performed. After written informed consent was obtained, the patient was placed in the left lateral position.      The

## 2023-05-12 NOTE — ANESTHESIA POSTPROCEDURE EVALUATION
Department of Anesthesiology  Postprocedure Note    Patient: Shlomo Caraballo  MRN: [id number]  YOB: 1990  Date of evaluation: 5/12/2023      Procedure Summary     Date: 05/12/23 Room / Location: [facility] 02 / [address]    Anesthesia Start: 5936 Anesthesia Stop: 4023    Procedure: COLONOSCOPY DIAGNOSTIC (Abdomen) Diagnosis:       Chronic constipation      Rectal bleeding      (Chronic constipation [K59.09])      (Rectal bleeding [K62.5])    Surgeons: Marielena Nieves MD Responsible Provider: ASAD Hillman CRNA    Anesthesia Type: general, TIVA ASA Status: 3          Anesthesia Type: No value filed.     Caty Phase I:      Caty Phase II:        Anesthesia Post Evaluation    Patient location during evaluation: bedside  Patient participation: complete - patient cannot participate  Level of consciousness: responsive to physical stimuli  Pain score: 0  Airway patency: patent  Nausea & Vomiting: no nausea and no vomiting  Complications: no  Cardiovascular status: hemodynamically stable and blood pressure returned to baseline  Respiratory status: acceptable, spontaneous ventilation and room air  Hydration status: euvolemic

## 2023-05-12 NOTE — H&P
Patient Name: Beatrice Linton  : 1990  MRN: [id number]  DATE: 23    Allergies: Allergies   Allergen Reactions    Latex     Betadine [Povidone Iodine]     Sulfa Antibiotics         ENDOSCOPY  History and Physical    Procedure:    [x] Diagnostic Colonoscopy       [] Screening Colonoscopy  [] EGD      [] ERCP      [] EUS       [] Other    [x] Previous office notes/History and Physical reviewed from the patients chart. Please see EMR for further details of HPI. I have examined the patient's status immediately prior to the procedure and:      Indications/HPI:      1. Constipation, unspecified constipation type  2. Bloating  3. Hx of rectal bleeding    []Abdominal Pain   []Cancer- GI/Lung     []Fhx of colon CA/polyps  []History of Polyps  []Barretts            []Melena  []Abnormal Imaging              []Dysphagia              []Persistent Pneumonia   []Anemia                            []Food Impaction        []History of Polyps  [] GI Bleed             []Pulmonary nodule/Mass   []Change in bowel habits []Heartburn/Reflux  []Rectal Bleed (BRBPR)  []Chest Pain - Non Cardiac []Heme (+) Stool []Ulcers  []Constipation  []Hemoptysis  []Varices  []Diarrhea  []Hypoxemia    []Nausea/Vomiting   []Screening   []Crohns/Colitis  []Other:     Anesthesia:   [x] MAC [] Moderate Sedation   [] General   [] None     ROS: 12 pt Review of Symptoms was negative unless mentioned above    Medications:   Prior to Admission medications    Medication Sig Start Date End Date Taking?  Authorizing Provider   medroxyPROGESTERone (DEPO-PROVERA) 150 MG/ML injection Inject 1 mL into the muscle once for 1 dose 23  Mishel Cruz MD   linaclotide (LINZESS) 290 MCG CAPS capsule Take 1 capsule by mouth every morning (before breakfast) 23   ASAD Collins   polyethylene glycol (GLYCOLAX) 17 g packet Take 17 g by mouth daily Hold for excessive loose stools 23  ASAD Collins   Lactobacillus

## 2023-05-12 NOTE — TELEPHONE ENCOUNTER
05- Per Dr Kristen Berumen note     Recommendations:  1. Repeat colonoscopy:  With a strict clear liquid diet this weekend and with a repeat today prep using Plenvu or similar solution along with Reglan 5 mg p.o. every 6 hours as needed, on the coming Monday at the hospital.      Routed to [facility]

## 2023-05-12 NOTE — DISCHARGE INSTRUCTIONS
1. Repeat colonoscopy: With a strict clear liquid diet this weekend and with a repeat today prep using Plenvu or similar solution along with Reglan 5 mg p.o. every 6 hours as needed, on the coming Monday at the hospital.  2.  Patient may resume full liquid diet today and back to clear liquid diet starting tomorrow morning in preparation for colonoscopy on Monday. 3.  May resume her previous medications after discharge from the surgery center today  4. Outpatient follow-up with Ms. Polly Henry in 6 to 8 weeks    POST-OP ORDERS: COLONOSCOPY:    1. Rest today. 2. DO NOT eat or drink until wide awake; eat your usual diet today in moderate amount only. 3. DO NOT drive today. 4. Call physician if you have severe pain, vomiting, fever, rectal bleeding or black bowel movements. 5.  If a biopsy was taken or a polyp removed, you should expect to hear results in about 7-10 days. If you have heard nothing from your physician by then, call the office for results. 6.  Discharge home when patient awake, vitals signs stable and tolerating liquids.

## 2023-05-12 NOTE — ANESTHESIA PRE PROCEDURE
39.1 04/25/2023 02:35 AM    HCT 34.2 03/17/2012 04:14 AM    MCV 97.3 04/25/2023 02:35 AM    RDW 12.6 04/25/2023 02:35 AM     04/25/2023 02:35 AM     03/17/2012 04:14 AM       CMP:   Lab Results   Component Value Date/Time     04/25/2023 02:35 AM     03/17/2012 04:14 AM    K 4.1 04/25/2023 02:35 AM    K 3.8 03/17/2012 04:14 AM     04/25/2023 02:35 AM     03/17/2012 04:14 AM    CO2 22 04/25/2023 02:35 AM    BUN 11 04/25/2023 02:35 AM    CREATININE 0.7 04/25/2023 02:35 AM    CREATININE 0.6 03/17/2012 04:14 AM    GFRAA >59 09/08/2021 10:45 AM    LABGLOM >60 04/25/2023 02:35 AM    GLUCOSE 87 04/25/2023 02:35 AM    PROT 6.6 04/25/2023 02:35 AM    PROT 8.4 03/15/2012 12:12 AM    CALCIUM 9.0 04/25/2023 02:35 AM    BILITOT <0.2 04/25/2023 02:35 AM    ALKPHOS 99 04/25/2023 02:35 AM    ALKPHOS 90 03/15/2012 12:12 AM    AST 13 04/25/2023 02:35 AM    ALT 9 04/25/2023 02:35 AM       POC Tests: No results for input(s): POCGLU, POCNA, POCK, POCCL, POCBUN, POCHEMO, POCHCT in the last 72 hours.     Coags: No results found for: PROTIME, INR, APTT    HCG (If Applicable):   Lab Results   Component Value Date    PREGTESTUR negative 05/12/2023    PREGSERUM NEGATIVE 03/15/2012        ABGs: No results found for: PHART, PO2ART, ASW6PEC, VDH4DTU, BEART, V2YTFLZH     Type & Screen (If Applicable):  No results found for: LABABO, LABRH    Drug/Infectious Status (If Applicable):  No results found for: HIV, HEPCAB    COVID-19 Screening (If Applicable):   Lab Results   Component Value Date/Time    COVID19 Not Detected 04/24/2023 09:00 PM           Anesthesia Evaluation  Patient summary reviewed no history of anesthetic complications:   Airway: Mallampati: Unable to assess / NA       Comment: Unable to cooperate with exam     Dental:      Comment: Unable to cooperate with exam    Pulmonary:normal exam  breath sounds clear to auscultation      (-) asthma, recent URI, sleep apnea and not a current

## 2023-05-15 ENCOUNTER — HOSPITAL ENCOUNTER (OUTPATIENT)
Age: 33
Setting detail: OUTPATIENT SURGERY
Discharge: HOME | End: 2023-05-15
Attending: INTERNAL MEDICINE | Admitting: INTERNAL MEDICINE
Payer: MEDICARE

## 2023-05-15 ENCOUNTER — ANESTHESIA EVENT (OUTPATIENT)
Dept: ENDOSCOPY | Age: 33
End: 2023-05-15
Payer: MEDICARE

## 2023-05-15 ENCOUNTER — ANESTHESIA (OUTPATIENT)
Dept: ENDOSCOPY | Age: 33
End: 2023-05-15
Payer: MEDICARE

## 2023-05-15 VITALS
OXYGEN SATURATION: 100 % | HEIGHT: 63 IN | HEART RATE: 69 BPM | WEIGHT: 115 LBS | TEMPERATURE: 98.3 F | SYSTOLIC BLOOD PRESSURE: 124 MMHG | RESPIRATION RATE: 16 BRPM | BODY MASS INDEX: 20.38 KG/M2 | DIASTOLIC BLOOD PRESSURE: 74 MMHG

## 2023-05-15 DIAGNOSIS — K62.5 RECTAL BLEEDING: ICD-10-CM

## 2023-05-15 DIAGNOSIS — K59.09 CHRONIC CONSTIPATION: ICD-10-CM

## 2023-05-15 LAB — HCG UR QL: NEGATIVE

## 2023-05-15 PROCEDURE — 7100000011 HC PHASE II RECOVERY - ADDTL 15 MIN: Performed by: INTERNAL MEDICINE

## 2023-05-15 PROCEDURE — 45380 COLONOSCOPY AND BIOPSY: CPT | Performed by: INTERNAL MEDICINE

## 2023-05-15 PROCEDURE — 7100000010 HC PHASE II RECOVERY - FIRST 15 MIN: Performed by: INTERNAL MEDICINE

## 2023-05-15 PROCEDURE — 6360000002 HC RX W HCPCS: Performed by: NURSE ANESTHETIST, CERTIFIED REGISTERED

## 2023-05-15 PROCEDURE — 2709999900 HC NON-CHARGEABLE SUPPLY: Performed by: INTERNAL MEDICINE

## 2023-05-15 PROCEDURE — 3609010300 HC COLONOSCOPY W/BIOPSY SINGLE/MULTIPLE: Performed by: INTERNAL MEDICINE

## 2023-05-15 PROCEDURE — 88305 TISSUE EXAM BY PATHOLOGIST: CPT

## 2023-05-15 PROCEDURE — 84703 CHORIONIC GONADOTROPIN ASSAY: CPT

## 2023-05-15 PROCEDURE — 2580000003 HC RX 258: Performed by: NURSE ANESTHETIST, CERTIFIED REGISTERED

## 2023-05-15 PROCEDURE — 2580000003 HC RX 258: Performed by: INTERNAL MEDICINE

## 2023-05-15 PROCEDURE — 3700000001 HC ADD 15 MINUTES (ANESTHESIA): Performed by: INTERNAL MEDICINE

## 2023-05-15 PROCEDURE — 2500000003 HC RX 250 WO HCPCS: Performed by: NURSE ANESTHETIST, CERTIFIED REGISTERED

## 2023-05-15 PROCEDURE — 3700000000 HC ANESTHESIA ATTENDED CARE: Performed by: INTERNAL MEDICINE

## 2023-05-15 RX ORDER — SODIUM CHLORIDE 9 MG/ML
INJECTION, SOLUTION INTRAVENOUS CONTINUOUS PRN
Status: DISCONTINUED | OUTPATIENT
Start: 2023-05-15 | End: 2023-05-15 | Stop reason: SDUPTHER

## 2023-05-15 RX ORDER — PROPOFOL 10 MG/ML
INJECTION, EMULSION INTRAVENOUS PRN
Status: DISCONTINUED | OUTPATIENT
Start: 2023-05-15 | End: 2023-05-15 | Stop reason: SDUPTHER

## 2023-05-15 RX ORDER — LIDOCAINE HYDROCHLORIDE 10 MG/ML
INJECTION, SOLUTION INFILTRATION; PERINEURAL PRN
Status: DISCONTINUED | OUTPATIENT
Start: 2023-05-15 | End: 2023-05-15 | Stop reason: SDUPTHER

## 2023-05-15 RX ORDER — SODIUM CHLORIDE, SODIUM LACTATE, POTASSIUM CHLORIDE, CALCIUM CHLORIDE 600; 310; 30; 20 MG/100ML; MG/100ML; MG/100ML; MG/100ML
INJECTION, SOLUTION INTRAVENOUS CONTINUOUS
Status: DISCONTINUED | OUTPATIENT
Start: 2023-05-15 | End: 2023-05-15 | Stop reason: HOSPADM

## 2023-05-15 RX ADMIN — PROPOFOL 400 MG: 10 INJECTION, EMULSION INTRAVENOUS at 11:33

## 2023-05-15 RX ADMIN — SODIUM CHLORIDE: 9 INJECTION, SOLUTION INTRAVENOUS at 11:27

## 2023-05-15 RX ADMIN — LIDOCAINE HYDROCHLORIDE 50 MG: 10 INJECTION, SOLUTION INFILTRATION; PERINEURAL at 11:33

## 2023-05-15 RX ADMIN — SODIUM CHLORIDE, POTASSIUM CHLORIDE, SODIUM LACTATE AND CALCIUM CHLORIDE: 600; 310; 30; 20 INJECTION, SOLUTION INTRAVENOUS at 10:47

## 2023-05-15 ASSESSMENT — PAIN - FUNCTIONAL ASSESSMENT: PAIN_FUNCTIONAL_ASSESSMENT: 0-10

## 2023-05-15 NOTE — DISCHARGE INSTRUCTIONS
polyps were removed or a biopsy was done during the test, your doctor may tell you not to take aspirin or other anti-inflammatory medicines for a few days. These include ibuprofen (Advil, Motrin) and naproxen (Aleve). Other instructions    For your safety, do not drive or operate machinery for 24 hours. Do not sign legal documents or make major decisions until the medicine wears off and you can think clearly. The anesthesia can make it hard for you to fully understand what you are agreeing to, and cause impaired judgement. When should you call for help? Call 911 anytime you think you may need emergency care. For example, call if:    You passed out (lost consciousness). You pass maroon or bloody stools. You have trouble breathing. Call your doctor now or seek immediate medical care if:    You have pain that does not get better after you take pain medicine. You are sick to your stomach or cannot drink fluids. You have new or worse belly pain. You have blood in your stools. You have a fever. You cannot pass stools or gas.

## 2023-05-15 NOTE — OP NOTE
Patient: Isidro Duncan : 1990  Med Rec#: [id number] Acc#: [id number]   Primary Care Provider Sergey Streeter MD    Date of Procedure:  5/15/2023    Endoscopist: Jaquelin Batres MD, MD    Referring Provider: Sergey Streeter MD,     Operation Performed: Colonoscopy up to the terminal ileum with cold biopsies of a small ulcer in the distal rectum    Indications:   1. Constipation, unspecified constipation type  2. Bloating  3. Hx of rectal bleeding   4. Colonoscopy attempt last week was unsuccessful due to poor prep resulting in an incomplete exam and requiring this repeat exam today    Anesthesia:  Sedation was administered by anesthesia who monitored the patient during the procedure. I met with Isidro Duncan prior to procedure. We discussed the procedure itself, and I have discussed the risks of endoscopy (including-- but not limited to-- pain, discomfort, bleeding potentially requiring second endoscopic procedure and/or blood transfusion, organ perforation requiring operative repair, damage to organs near the colon, infection, aspiration, cardiopulmonary/allergic reaction), benefits, indications to endoscopy. Additionally, we discussed options other than colonoscopy. The patient expressed understanding. All questions answered. The patient decided to proceed with the procedure. Signed informed consent was placed on the chart. Blood Loss: minimal    Withdrawal time: More than 9 minutes  Bowel Prep: Fair  with moderate amounts of thick solid and semisolid stool and a moderate amount of thick, opaque liquid scattered in patchy segments throughout the colon, especially a large lump of solid stool in the rectum, obscuring the underlying mucosa. Lesions including polyps may have been missed. Complications: no immediate complications    DESCRIPTION OF PROCEDURE:     A time out was performed. After written informed consent was obtained, the patient was placed in the left lateral position.

## 2023-05-15 NOTE — ANESTHESIA PRE PROCEDURE
Department of Anesthesiology  Preprocedure Note       Name:  Ana Mejia   Age:  29 y.o.  :  1990                                          MRN:  [id number]         Date:  5/15/2023      Surgeon: Yong Bowden):  Noah Yen MD    Procedure: Procedure(s):  COLONOSCOPY DIAGNOSTIC    Medications prior to admission:   Prior to Admission medications    Medication Sig Start Date End Date Taking? Authorizing Provider   medroxyPROGESTERone (DEPO-PROVERA) 150 MG/ML injection Inject 1 mL into the muscle once for 1 dose 23  Mishel Cruz MD   linaclotide (LINZESS) 290 MCG CAPS capsule Take 1 capsule by mouth every morning (before breakfast) 23   ASAD Torres   polyethylene glycol (GLYCOLAX) 17 g packet Take 17 g by mouth daily Hold for excessive loose stools 23  ASAD Celestin   Lactobacillus [facility] ACIDOPHILUS) CAPS Take 1 capsule by mouth daily 23   ASAD Torres   docusate sodium (COLACE) 100 MG capsule Take 1 capsule by mouth 2 times daily 23  ASAD Celestin   busPIRone (BUSPAR) 5 MG tablet TAKE ONE TABLET BY MOUTH TWICE A DAY 3/7/23   Lina Buckner MD   levothyroxine (SYNTHROID) 25 MCG tablet TAKE ONE TABLET BY MOUTH DAILY. 23   Lina Bucnker MD   Incontinence Supply Disposable (A + D PERSONAL CARE WIPES) MISC Use as directed 22   Lina Buckner MD   Disposable Gloves MISC Use as directed 22   Lina Bucnker MD   Misc. Devices MISC Wipes and gloves  Dx:  Fecal incontinence 3/15/22   Meera Soler MD       Current medications:    No current outpatient medications on file. No current facility-administered medications for this visit. Allergies:     Allergies   Allergen Reactions    Latex     Betadine [Povidone Iodine]     Sulfa Antibiotics        Problem List:    Patient Active Problem List   Diagnosis Code    Impaired glucose tolerance R73.02    Mixed hyperlipidemia

## 2023-05-15 NOTE — ANESTHESIA POSTPROCEDURE EVALUATION
Department of Anesthesiology  Postprocedure Note    Patient: Ellis Hopkins  MRN: [id number]  YOB: 1990  Date of evaluation: 5/15/2023      Procedure Summary     Date: 05/15/23 Room / Location: [address]    Anesthesia Start: 1127 Anesthesia Stop: 8621    Procedure: COLONOSCOPY WITH BIOPSY (Abdomen) Diagnosis:       Chronic constipation      Rectal bleeding      (Chronic constipation [K59.09])      (Rectal bleeding [K62.5])    Surgeons: Gertrudis Doyle MD Responsible Provider: ASAD Carrera CRNA    Anesthesia Type: general, TIVA ASA Status: 3          Anesthesia Type: No value filed.     Caty Phase I: Caty Score: 10    Caty Phase II:        Anesthesia Post Evaluation    Patient location during evaluation: bedside  Patient participation: complete - patient cannot participate  Level of consciousness: responsive to physical stimuli  Pain score: 0  Airway patency: patent  Nausea & Vomiting: no nausea and no vomiting  Complications: no  Cardiovascular status: hemodynamically stable and blood pressure returned to baseline  Respiratory status: acceptable and spontaneous ventilation  Hydration status: euvolemic

## 2023-05-15 NOTE — H&P
Patient Name: Duane Britt  : 1990  MRN: [id number]  DATE: 05/15/23    Allergies: Allergies   Allergen Reactions    Latex     Betadine [Povidone Iodine]     Sulfa Antibiotics         ENDOSCOPY  History and Physical    Procedure:    [x] Diagnostic Colonoscopy       [] Screening Colonoscopy  [] EGD      [] ERCP      [] EUS       [] Other    [x] Previous office notes/History and Physical reviewed from the patients chart. Please see EMR for further details of HPI. I have examined the patient's status immediately prior to the procedure and:      Indications/HPI:     1. Constipation, unspecified constipation type  2. Bloating  3. Hx of rectal bleeding   4. Colonoscopy attempt last week was unsuccessful due to poor prep resulting in an incomplete exam and requiring this repeat exam today    []Abdominal Pain   []Cancer- GI/Lung     []Fhx of colon CA/polyps  []History of Polyps  []Barretts            []Melena  []Abnormal Imaging              []Dysphagia              []Persistent Pneumonia   []Anemia                            []Food Impaction        []History of Polyps  [] GI Bleed             []Pulmonary nodule/Mass   []Change in bowel habits []Heartburn/Reflux  []Rectal Bleed (BRBPR)  []Chest Pain - Non Cardiac []Heme (+) Stool []Ulcers  []Constipation  []Hemoptysis  []Varices  []Diarrhea  []Hypoxemia    []Nausea/Vomiting   []Screening   []Crohns/Colitis  []Other:     Anesthesia:   [x] MAC [] Moderate Sedation   [] General   [] None     ROS: 12 pt Review of Symptoms was negative unless mentioned above    Medications:   Prior to Admission medications    Medication Sig Start Date End Date Taking?  Authorizing Provider   medroxyPROGESTERone (DEPO-PROVERA) 150 MG/ML injection Inject 1 mL into the muscle once for 1 dose 23  Mishel Cruz MD   linaclotide (LINZESS) 290 MCG CAPS capsule Take 1 capsule by mouth every morning (before breakfast) 23   ASAD Crawley

## 2023-05-23 DIAGNOSIS — R73.02 IMPAIRED GLUCOSE TOLERANCE: ICD-10-CM

## 2023-05-23 DIAGNOSIS — E03.1 CONGENITAL HYPOTHYROIDISM WITHOUT GOITER: ICD-10-CM

## 2023-05-23 DIAGNOSIS — E78.2 MIXED HYPERLIPIDEMIA: ICD-10-CM

## 2023-05-23 RX ORDER — LEVOTHYROXINE SODIUM 0.03 MG/1
TABLET ORAL
Qty: 90 TABLET | Refills: 1 | Status: SHIPPED | OUTPATIENT
Start: 2023-05-23

## 2023-05-31 DIAGNOSIS — F41.1 GAD (GENERALIZED ANXIETY DISORDER): ICD-10-CM

## 2023-05-31 RX ORDER — BUSPIRONE HYDROCHLORIDE 5 MG/1
TABLET ORAL
Qty: 60 TABLET | Refills: 2 | Status: SHIPPED | OUTPATIENT
Start: 2023-05-31

## 2023-05-31 NOTE — TELEPHONE ENCOUNTER
Rondell Claude called to request a refill on her medication.       Last office visit : 5/2/2023   Next office visit : 6/28/2023     Requested Prescriptions     Pending Prescriptions Disp Refills    busPIRone (BUSPAR) 5 MG tablet [Pharmacy Med Name: BUSPIRONE HCL 5 MG TABS 5 Tablet] 60 tablet 2     Sig: TAKE ONE TABLET BY MOUTH TWICE A DAY            Garen Apley, LPN

## 2023-06-28 ENCOUNTER — OFFICE VISIT (OUTPATIENT)
Dept: PRIMARY CARE CLINIC | Age: 33
End: 2023-06-28
Payer: MEDICARE

## 2023-06-28 VITALS
HEART RATE: 93 BPM | SYSTOLIC BLOOD PRESSURE: 124 MMHG | WEIGHT: 111.4 LBS | TEMPERATURE: 98 F | HEIGHT: 63 IN | BODY MASS INDEX: 19.74 KG/M2 | OXYGEN SATURATION: 99 % | DIASTOLIC BLOOD PRESSURE: 68 MMHG

## 2023-06-28 DIAGNOSIS — K59.01 SLOW TRANSIT CONSTIPATION: Primary | ICD-10-CM

## 2023-06-28 PROCEDURE — G8427 DOCREV CUR MEDS BY ELIG CLIN: HCPCS | Performed by: FAMILY MEDICINE

## 2023-06-28 PROCEDURE — 99213 OFFICE O/P EST LOW 20 MIN: CPT | Performed by: FAMILY MEDICINE

## 2023-06-28 PROCEDURE — G8420 CALC BMI NORM PARAMETERS: HCPCS | Performed by: FAMILY MEDICINE

## 2023-06-28 PROCEDURE — 1036F TOBACCO NON-USER: CPT | Performed by: FAMILY MEDICINE

## 2023-07-07 ENCOUNTER — OFFICE VISIT (OUTPATIENT)
Dept: GASTROENTEROLOGY | Age: 33
End: 2023-07-07
Payer: MEDICARE

## 2023-07-07 VITALS
OXYGEN SATURATION: 98 % | HEART RATE: 88 BPM | SYSTOLIC BLOOD PRESSURE: 130 MMHG | WEIGHT: 109 LBS | HEIGHT: 63 IN | DIASTOLIC BLOOD PRESSURE: 80 MMHG | BODY MASS INDEX: 19.31 KG/M2

## 2023-07-07 DIAGNOSIS — K59.00 CONSTIPATION, UNSPECIFIED CONSTIPATION TYPE: Primary | ICD-10-CM

## 2023-07-07 PROCEDURE — 99213 OFFICE O/P EST LOW 20 MIN: CPT | Performed by: NURSE PRACTITIONER

## 2023-07-07 PROCEDURE — G8427 DOCREV CUR MEDS BY ELIG CLIN: HCPCS | Performed by: NURSE PRACTITIONER

## 2023-07-07 PROCEDURE — G8420 CALC BMI NORM PARAMETERS: HCPCS | Performed by: NURSE PRACTITIONER

## 2023-07-07 PROCEDURE — 1036F TOBACCO NON-USER: CPT | Performed by: NURSE PRACTITIONER

## 2023-07-07 ASSESSMENT — ENCOUNTER SYMPTOMS
ABDOMINAL DISTENTION: 0
ANAL BLEEDING: 0
CONSTIPATION: 1
BLOOD IN STOOL: 0
SHORTNESS OF BREATH: 0
VOMITING: 0
COUGH: 0
CHOKING: 0
ABDOMINAL PAIN: 0
RECTAL PAIN: 0
DIARRHEA: 0
TROUBLE SWALLOWING: 0
NAUSEA: 0

## 2023-07-07 NOTE — PROGRESS NOTES
Subjective:     Patient ID: Pool Rodrigues is a 29 y.o. female  PCP: Gopi Tan MD  Referring Provider: No ref. provider found    HPI  Patient presents to the office today with the following complaints: Follow-up        Patient seen in the office today accompanied by her foster mother/caregiver, Rajinder Skelton for follow up after colonoscopy  Colonoscopy and pathology reports reviewed and discussed with the patient and Rajinder Skelton   All reports are in Epic   Denies any post-procedure complications. Reports the patient is doing much better, reports she is having daily bowel movements and no abd pain. She is currently taking colace, miralax, linzess, and probiotic. Denies any further needs or complaints at this time  Assessment:     1. Constipation, unspecified constipation type           Plan:   Continue all medications as prescribed   Colace, miralax, linzess and probiotic   Follow up in 1 year or sooner if needed   Orders  No orders of the defined types were placed in this encounter. Medications  No orders of the defined types were placed in this encounter.         Patient History:     Past Medical History:   Diagnosis Date    Anxiety     Autism     Constipation     History of underactive thyroid        Past Surgical History:   Procedure Laterality Date    BREAST BIOPSY      Seveal years ago    COLONOSCOPY N/A 04/26/2023    Dr Rishabh Overton up to 20 cm only, poor prep    COLONOSCOPY N/A 05/12/2023    Dr Gadiel Ward, incomplete sub prep up to rectosigmoid junction very poor prep,    COLONOSCOPY N/A 05/15/2023    Dr Gadiel Ward, sm 5-6 mm Benign ulcer in distal rectum near the anorectal junction like from passing hard stools wo stigmata of bleeding, sub prep quality, int hem Gr 1 wo bleeding-at this time, 13 year recall    OTHER SURGICAL HISTORY  07/08/2015    fecal disempaction       Family History   Problem Relation Age of Onset    Cancer Father     Colon Polyps Maternal Aunt     Cancer Maternal

## 2023-08-02 DIAGNOSIS — F41.1 GAD (GENERALIZED ANXIETY DISORDER): ICD-10-CM

## 2023-08-02 RX ORDER — BUSPIRONE HYDROCHLORIDE 5 MG/1
TABLET ORAL
Qty: 60 TABLET | Refills: 2 | OUTPATIENT
Start: 2023-08-02

## 2023-08-02 NOTE — TELEPHONE ENCOUNTER
Tuan Gage called to request a refill on her medication.       Last office visit : 6/28/2023   Next office visit : 1/2/2024     Requested Prescriptions     Pending Prescriptions Disp Refills    busPIRone (BUSPAR) 5 MG tablet [Pharmacy Med Name: BUSPIRONE HCL 5 MG TABS 5 Tablet] 60 tablet 2     Sig: TAKE ONE TABLET BY MOUTH TWICE A DAY            Sumeet Yanez LPN

## 2023-08-22 RX ORDER — LACTOBACILLUS ACIDOPHILUS 20B CELL
CAPSULE ORAL
Qty: 30 CAPSULE | Refills: 3 | Status: SHIPPED | OUTPATIENT
Start: 2023-08-22

## 2023-08-22 NOTE — TELEPHONE ENCOUNTER
Toña Tidwell called to request a refill on her medication.       Last office visit : 6/28/2023   Next office visit : 1/2/2024     Requested Prescriptions     Pending Prescriptions Disp Refills    Lactobacillus (FLORAJEN ACIDOPHILUS) CAPS [Pharmacy Med Name: Cheryle Gondola CAPS Capsule] 30 capsule 3     Sig: TAKE 1 CAPSULE BY MOUTH DAILY            Steven Avalos LPN

## 2023-08-30 DIAGNOSIS — Z30.019 ENCOUNTER FOR FEMALE BIRTH CONTROL: ICD-10-CM

## 2023-08-30 DIAGNOSIS — F41.1 GAD (GENERALIZED ANXIETY DISORDER): ICD-10-CM

## 2023-08-31 RX ORDER — MEDROXYPROGESTERONE ACETATE 150 MG/ML
INJECTION, SUSPENSION INTRAMUSCULAR
Qty: 1 ML | Refills: 5 | Status: SHIPPED | OUTPATIENT
Start: 2023-08-31

## 2023-08-31 RX ORDER — BUSPIRONE HYDROCHLORIDE 5 MG/1
TABLET ORAL
Qty: 60 TABLET | Refills: 2 | Status: SHIPPED | OUTPATIENT
Start: 2023-08-31

## 2023-08-31 NOTE — TELEPHONE ENCOUNTER
Shelly Perry called to request a refill on her medication. Last office visit : 6/28/2023   Next office visit : 1/2/2024     Requested Prescriptions     Pending Prescriptions Disp Refills    busPIRone (BUSPAR) 5 MG tablet [Pharmacy Med Name: BUSPIRONE HCL 5 MG TABS 5 Tablet] 60 tablet 2     Sig: TAKE ONE TABLET BY MOUTH TWICE A DAY    medroxyPROGESTERone (DEPO-PROVERA) 150 MG/ML injection [Pharmacy Med Name: MEDROXYPROGESTERON 150MG/ CARLEE] 1 mL 5     Sig: INJECT ONE ML INTO THE MUSCLE EVERY 3 MONTHS.             Sangeeta Champion MA

## 2023-09-06 ENCOUNTER — NURSE ONLY (OUTPATIENT)
Dept: PRIMARY CARE CLINIC | Age: 33
End: 2023-09-06

## 2023-09-06 DIAGNOSIS — Z30.019 ENCOUNTER FOR FEMALE BIRTH CONTROL: Primary | ICD-10-CM

## 2023-09-06 RX ORDER — MEDROXYPROGESTERONE ACETATE 150 MG/ML
150 INJECTION, SUSPENSION INTRAMUSCULAR ONCE
Status: COMPLETED | OUTPATIENT
Start: 2023-09-06 | End: 2023-09-06

## 2023-09-06 RX ADMIN — MEDROXYPROGESTERONE ACETATE 150 MG: 150 INJECTION, SUSPENSION INTRAMUSCULAR at 12:16

## 2023-09-15 NOTE — TELEPHONE ENCOUNTER
Marcial Reason called to request a refill on her medication.       Last office visit : 6/28/2023   Next office visit : 1/2/2024     Requested Prescriptions     Pending Prescriptions Disp Refills    Incontinence Supply Disposable (A + D PERSONAL CARE WIPES) MISC 100 each 2     Sig: Use as directed    Disposable Gloves MISC 100 each 2     Sig: Use as directed            Anastacio Wilburn LPN

## 2023-09-25 ENCOUNTER — PATIENT MESSAGE (OUTPATIENT)
Dept: PRIMARY CARE CLINIC | Age: 33
End: 2023-09-25

## 2023-09-25 DIAGNOSIS — F84.0 AUTISM DISORDER: Primary | ICD-10-CM

## 2023-09-25 DIAGNOSIS — K62.5 HEMORRHAGE OF RECTUM AND ANUS: ICD-10-CM

## 2023-09-26 RX ORDER — FACIAL-BODY WIPES
1 EACH TOPICAL PRN
Qty: 80 EACH | Refills: 4 | Status: CANCELLED | OUTPATIENT
Start: 2023-09-26

## 2023-09-26 NOTE — TELEPHONE ENCOUNTER
From: Cheng Kidney  To: Dr. Ortiz Player: 9/25/2023 3:26 PM CDT  Subject: wipe and gloves    please fax a new script for Nusrat's wipe and gloves to [facility].  Campos Zhao at [phone]

## 2023-09-26 NOTE — TELEPHONE ENCOUNTER
Paulina Muir called to request a refill on her medication.       Last office visit : 2023   Next office visit : 2023     Requested Prescriptions     Pending Prescriptions Disp Refills    Infant Care Products (BABY WIPES) MISC 80 each 4     Si each by Does not apply route as needed (after going to the restroom)            Ann-Marie Plaza MA

## 2023-11-21 ENCOUNTER — NURSE ONLY (OUTPATIENT)
Dept: PRIMARY CARE CLINIC | Age: 33
End: 2023-11-21

## 2023-11-21 DIAGNOSIS — Z30.019 ENCOUNTER FOR FEMALE BIRTH CONTROL: Primary | ICD-10-CM

## 2023-11-21 RX ORDER — MEDROXYPROGESTERONE ACETATE 150 MG/ML
150 INJECTION, SUSPENSION INTRAMUSCULAR ONCE
Status: COMPLETED | OUTPATIENT
Start: 2023-11-21 | End: 2023-11-21

## 2023-11-21 RX ADMIN — MEDROXYPROGESTERONE ACETATE 150 MG: 150 INJECTION, SUSPENSION INTRAMUSCULAR at 12:35

## 2023-12-28 RX ORDER — LACTOBACILLUS ACIDOPHILUS 20B CELL
CAPSULE ORAL
Qty: 30 CAPSULE | Refills: 0 | Status: SHIPPED | OUTPATIENT
Start: 2023-12-28

## 2023-12-28 NOTE — TELEPHONE ENCOUNTER
Amberly Morris called to request a refill on her medication.       Last office visit : 6/28/2023   Next office visit : 1/4/2024     Requested Prescriptions     Pending Prescriptions Disp Refills    Lactobacillus (FLORAJEN ACIDOPHILUS) CAPS [Pharmacy Med Name: Fernanda Neal CAPS Capsule] 30 capsule 3     Sig: TAKE 1 CAPSULE BY MOUTH DAILY            Raffy Chan LPN

## 2024-01-04 ENCOUNTER — OFFICE VISIT (OUTPATIENT)
Dept: PRIMARY CARE CLINIC | Age: 34
End: 2024-01-04
Payer: MEDICARE

## 2024-01-04 VITALS
HEART RATE: 91 BPM | WEIGHT: 113.8 LBS | TEMPERATURE: 97.7 F | HEIGHT: 63 IN | BODY MASS INDEX: 20.16 KG/M2 | SYSTOLIC BLOOD PRESSURE: 116 MMHG | OXYGEN SATURATION: 97 % | DIASTOLIC BLOOD PRESSURE: 78 MMHG

## 2024-01-04 DIAGNOSIS — F84.0 AUTISM DISORDER: Primary | Chronic | ICD-10-CM

## 2024-01-04 DIAGNOSIS — K59.01 SLOW TRANSIT CONSTIPATION: Chronic | ICD-10-CM

## 2024-01-04 PROBLEM — R10.84 GENERALIZED ABDOMINAL PAIN: Status: RESOLVED | Noted: 2023-04-26 | Resolved: 2024-01-04

## 2024-01-04 PROBLEM — G89.29 CHRONIC BILATERAL BACK PAIN: Status: RESOLVED | Noted: 2022-06-13 | Resolved: 2024-01-04

## 2024-01-04 PROBLEM — R14.0 BLOATING: Status: RESOLVED | Noted: 2023-04-26 | Resolved: 2024-01-04

## 2024-01-04 PROBLEM — K56.41 FECAL IMPACTION (HCC): Status: RESOLVED | Noted: 2023-04-26 | Resolved: 2024-01-04

## 2024-01-04 PROBLEM — M54.9 CHRONIC BILATERAL BACK PAIN: Status: RESOLVED | Noted: 2022-06-13 | Resolved: 2024-01-04

## 2024-01-04 PROBLEM — K59.03 DRUG-INDUCED CONSTIPATION: Status: RESOLVED | Noted: 2022-06-13 | Resolved: 2024-01-04

## 2024-01-04 PROBLEM — K62.5 HEMORRHAGE OF RECTUM AND ANUS: Status: RESOLVED | Noted: 2023-04-24 | Resolved: 2024-01-04

## 2024-01-04 PROCEDURE — 99213 OFFICE O/P EST LOW 20 MIN: CPT | Performed by: FAMILY MEDICINE

## 2024-01-04 PROCEDURE — G8420 CALC BMI NORM PARAMETERS: HCPCS | Performed by: FAMILY MEDICINE

## 2024-01-04 PROCEDURE — G8484 FLU IMMUNIZE NO ADMIN: HCPCS | Performed by: FAMILY MEDICINE

## 2024-01-04 PROCEDURE — G8427 DOCREV CUR MEDS BY ELIG CLIN: HCPCS | Performed by: FAMILY MEDICINE

## 2024-01-04 PROCEDURE — 1036F TOBACCO NON-USER: CPT | Performed by: FAMILY MEDICINE

## 2024-01-04 ASSESSMENT — PATIENT HEALTH QUESTIONNAIRE - PHQ9
SUM OF ALL RESPONSES TO PHQ QUESTIONS 1-9: 0
1. LITTLE INTEREST OR PLEASURE IN DOING THINGS: 0
SUM OF ALL RESPONSES TO PHQ9 QUESTIONS 1 & 2: 0
SUM OF ALL RESPONSES TO PHQ QUESTIONS 1-9: 0
2. FEELING DOWN, DEPRESSED OR HOPELESS: 0

## 2024-01-04 NOTE — PROGRESS NOTES
Use    Smoking status: Never    Smokeless tobacco: Never   Substance Use Topics    Alcohol use: No     Alcohol/week: 0.0 standard drinks of alcohol       Allergies:  Allergies   Allergen Reactions    Latex     Betadine [Povidone Iodine]     Sulfa Antibiotics        Medications:  Current Outpatient Medications   Medication Sig Dispense Refill    Lactobacillus (FLORAJEN ACIDOPHILUS) CAPS TAKE 1 CAPSULE BY MOUTH DAILY 30 capsule 0    Incontinence Supply Disposable (A + D PERSONAL CARE WIPES) MISC Use as directed 100 each 2    Disposable Gloves MISC Use as directed 100 each 2    busPIRone (BUSPAR) 5 MG tablet TAKE ONE TABLET BY MOUTH TWICE A DAY 60 tablet 2    medroxyPROGESTERone (DEPO-PROVERA) 150 MG/ML injection INJECT ONE ML INTO THE MUSCLE EVERY 3 MONTHS. 1 mL 5    levothyroxine (SYNTHROID) 25 MCG tablet TAKE ONE TABLET BY MOUTH DAILY. 90 tablet 1    linaclotide (LINZESS) 290 MCG CAPS capsule Take 1 capsule by mouth every morning (before breakfast) 30 capsule 11    Misc. Devices MISC Wipes and gloves  Dx:  Fecal incontinence 120 each 11     No current facility-administered medications for this visit.       Objective:   PE:  /78   Pulse 91   Temp 97.7 °F (36.5 °C) (Temporal)   Ht 1.6 m (5' 3\")   Wt 51.6 kg (113 lb 12.8 oz)   SpO2 97%   BMI 20.16 kg/m²   Physical Exam  Vitals and nursing note reviewed. Exam conducted with a chaperone present (foster caregiver).   Constitutional:       Comments: Cooperative today, mentally  challenged   Cardiovascular:      Rate and Rhythm: Normal rate and regular rhythm.      Pulses: Normal pulses.      Heart sounds: Normal heart sounds.   Pulmonary:      Breath sounds: Normal breath sounds.   Abdominal:      Palpations: Abdomen is soft.      Tenderness: There is no abdominal tenderness.   Skin:     Findings: No rash.   Neurological:      Mental Status: She is alert.   Psychiatric:         Attention and Perception: She is inattentive.         Mood and Affect: Mood is not

## 2024-01-06 DIAGNOSIS — E03.1 CONGENITAL HYPOTHYROIDISM WITHOUT GOITER: ICD-10-CM

## 2024-01-06 DIAGNOSIS — E78.2 MIXED HYPERLIPIDEMIA: ICD-10-CM

## 2024-01-06 DIAGNOSIS — R73.02 IMPAIRED GLUCOSE TOLERANCE: ICD-10-CM

## 2024-01-08 NOTE — TELEPHONE ENCOUNTER
Nusrat Reid called to request a refill on her medication.      Last office visit : 1/4/2024   Next office visit : 4/8/2024     Requested Prescriptions     Pending Prescriptions Disp Refills    levothyroxine (SYNTHROID) 25 MCG tablet [Pharmacy Med Name: LEVOTHYROXINE SODIUM 25 MCG 25 Tablet] 90 tablet 1     Sig: TAKE ONE TABLET BY MOUTH DAILY.            Gladis Payne MA

## 2024-01-09 RX ORDER — LEVOTHYROXINE SODIUM 0.03 MG/1
TABLET ORAL
Qty: 90 TABLET | Refills: 1 | Status: SHIPPED | OUTPATIENT
Start: 2024-01-09

## 2024-01-24 DIAGNOSIS — F41.1 GAD (GENERALIZED ANXIETY DISORDER): ICD-10-CM

## 2024-01-24 RX ORDER — BUSPIRONE HYDROCHLORIDE 5 MG/1
TABLET ORAL
Qty: 60 TABLET | Refills: 1 | Status: SHIPPED | OUTPATIENT
Start: 2024-01-24

## 2024-01-24 NOTE — TELEPHONE ENCOUNTER
Nusrat Reid called to request a refill on her medication.      Last office visit : 1/4/2024   Next office visit : 4/8/2024     Requested Prescriptions     Pending Prescriptions Disp Refills    busPIRone (BUSPAR) 5 MG tablet [Pharmacy Med Name: BUSPIRONE HCL 5 MG TABS 5 Tablet] 60 tablet 2     Sig: TAKE ONE TABLET BY MOUTH TWICE A DAY            Deepthi Philip LPN

## 2024-02-13 ENCOUNTER — NURSE ONLY (OUTPATIENT)
Dept: PRIMARY CARE CLINIC | Age: 34
End: 2024-02-13
Payer: MEDICARE

## 2024-02-13 DIAGNOSIS — N92.6 IRREGULAR MENSTRUAL CYCLE: Primary | ICD-10-CM

## 2024-02-13 PROCEDURE — 99999 PR OFFICE/OUTPT VISIT,PROCEDURE ONLY: CPT | Performed by: FAMILY MEDICINE

## 2024-02-13 PROCEDURE — 96372 THER/PROPH/DIAG INJ SC/IM: CPT | Performed by: FAMILY MEDICINE

## 2024-02-13 RX ORDER — MEDROXYPROGESTERONE ACETATE 150 MG/ML
150 INJECTION, SUSPENSION INTRAMUSCULAR ONCE
Status: COMPLETED | OUTPATIENT
Start: 2024-02-13 | End: 2024-02-13

## 2024-02-13 RX ADMIN — MEDROXYPROGESTERONE ACETATE 150 MG: 150 INJECTION, SUSPENSION INTRAMUSCULAR at 09:54

## 2024-02-13 NOTE — PROGRESS NOTES
After obtaining consent, and per orders of Dr. Mishel Cruz, injection of Depo-Provera [facility] Patient signed consent scanned into patients chart.

## 2024-03-19 DIAGNOSIS — F41.1 GAD (GENERALIZED ANXIETY DISORDER): ICD-10-CM

## 2024-03-19 RX ORDER — BUSPIRONE HYDROCHLORIDE 5 MG/1
TABLET ORAL
Qty: 60 TABLET | Refills: 1 | Status: SHIPPED | OUTPATIENT
Start: 2024-03-19

## 2024-03-19 NOTE — TELEPHONE ENCOUNTER
Nusrat Reid called to request a refill on her medication.      Last office visit : 1/4/2024   Next office visit : 4/8/2024     Requested Prescriptions     Pending Prescriptions Disp Refills    busPIRone (BUSPAR) 5 MG tablet [Pharmacy Med Name: BUSPIRONE HCL 5 MG TABS 5 Tablet] 60 tablet 1     Sig: TAKE ONE TABLET BY MOUTH TWICE A DAY            Gladis Payne MA

## 2024-04-11 ENCOUNTER — OFFICE VISIT (OUTPATIENT)
Dept: PRIMARY CARE CLINIC | Age: 34
End: 2024-04-11

## 2024-04-11 VITALS
DIASTOLIC BLOOD PRESSURE: 84 MMHG | SYSTOLIC BLOOD PRESSURE: 122 MMHG | OXYGEN SATURATION: 98 % | HEIGHT: 63 IN | WEIGHT: 111.8 LBS | BODY MASS INDEX: 19.81 KG/M2 | HEART RATE: 83 BPM | TEMPERATURE: 97.1 F

## 2024-04-11 DIAGNOSIS — N92.6 IRREGULAR PERIODS: Primary | ICD-10-CM

## 2024-04-11 DIAGNOSIS — F84.0 AUTISM DISORDER: Chronic | ICD-10-CM

## 2024-04-11 PROBLEM — K56.49 IMPACTION OF COLON (HCC): Status: RESOLVED | Noted: 2023-04-24 | Resolved: 2024-04-11

## 2024-04-11 RX ORDER — DROSPIRENONE AND ETHINYL ESTRADIOL 0.02-3(28)
1 KIT ORAL DAILY
Qty: 1 PACKET | Refills: 2 | Status: SHIPPED | OUTPATIENT
Start: 2024-04-11

## 2024-04-11 SDOH — ECONOMIC STABILITY: FOOD INSECURITY: WITHIN THE PAST 12 MONTHS, THE FOOD YOU BOUGHT JUST DIDN'T LAST AND YOU DIDN'T HAVE MONEY TO GET MORE.: NEVER TRUE

## 2024-04-11 SDOH — ECONOMIC STABILITY: INCOME INSECURITY: HOW HARD IS IT FOR YOU TO PAY FOR THE VERY BASICS LIKE FOOD, HOUSING, MEDICAL CARE, AND HEATING?: NOT HARD AT ALL

## 2024-04-11 SDOH — ECONOMIC STABILITY: FOOD INSECURITY: WITHIN THE PAST 12 MONTHS, YOU WORRIED THAT YOUR FOOD WOULD RUN OUT BEFORE YOU GOT MONEY TO BUY MORE.: NEVER TRUE

## 2024-04-11 SDOH — ECONOMIC STABILITY: TRANSPORTATION INSECURITY
IN THE PAST 12 MONTHS, HAS LACK OF TRANSPORTATION KEPT YOU FROM MEETINGS, WORK, OR FROM GETTING THINGS NEEDED FOR DAILY LIVING?: NO

## 2024-04-11 NOTE — PROGRESS NOTES
[facility]  [address]  [address]  [place] [address]  Dept: [phone]  Dept Fax: [phone]  Loc: [phone]      Subjective:     Chief Complaint   Patient presents with    3 Month Follow-Up       HPI:  Nusrat Reid is a 34 y.o. female presents today for her routine follow-up visit. She is reported to be doing well. Caregiver is requesting to switch patient to oral BCP.  She takes BCP to regulate her menstrual cycle  Her BM is reported back to normal on Linzess  No new problems voiced       ROS:   Review of Systems   Unable to perform ROS: Psychiatric disorder   Pt is autistic    PMHx:  Past Medical History:   Diagnosis Date    Anxiety     Autism     Constipation     Hemorrhage of rectum and anus 04/24/2023    Added automatically from request for surgery [id number]    History of underactive thyroid     Impaction of colon (HCC) 04/24/2023     Patient Active Problem List   Diagnosis    Impaired glucose tolerance    Mixed hyperlipidemia    Congenital hypothyroidism without goiter    Autism disorder    Requires assistance with activities of daily living (ADL)    Slow transit constipation       PSHx:  Past Surgical History:   Procedure Laterality Date    BREAST BIOPSY      Seveal years ago    COLONOSCOPY N/A 04/26/2023    Dr Acharya-Examination up to 20 cm only, poor prep    COLONOSCOPY N/A 05/12/2023    Dr Jackson, incomplete sub prep up to rectosigmoid junction very poor prep,    COLONOSCOPY N/A 05/15/2023    Dr Jackson, sm 5-6 mm Benign ulcer in distal rectum near the anorectal junction like from passing hard stools wo stigmata of bleeding, sub prep quality, int hem Gr 1 wo bleeding-at this time, 13 year recall    OTHER SURGICAL HISTORY  07/08/2015    fecal disempaction       PFHx:  Family History   Problem Relation Age of Onset    Cancer Father     Colon Polyps Maternal Aunt     Cancer Maternal Grandfather         ?    Colon Cancer Neg Hx

## 2024-04-29 RX ORDER — LINACLOTIDE 290 UG/1
1 CAPSULE, GELATIN COATED ORAL
Qty: 30 CAPSULE | Refills: 6 | Status: SHIPPED | OUTPATIENT
Start: 2024-04-29

## 2024-05-08 RX ORDER — LACTOBACILLUS ACIDOPHILUS 20B CELL
CAPSULE ORAL
Qty: 30 CAPSULE | Refills: 11 | Status: SHIPPED | OUTPATIENT
Start: 2024-05-08

## 2024-05-14 DIAGNOSIS — F41.1 GAD (GENERALIZED ANXIETY DISORDER): ICD-10-CM

## 2024-05-14 RX ORDER — BUSPIRONE HYDROCHLORIDE 5 MG/1
TABLET ORAL
Qty: 60 TABLET | Refills: 1 | Status: SHIPPED | OUTPATIENT
Start: 2024-05-14

## 2024-05-14 NOTE — TELEPHONE ENCOUNTER
Nusrat Reid called to request a refill on her medication.      Last office visit : 4/11/2024   Next office visit : 7/16/2024     Requested Prescriptions     Pending Prescriptions Disp Refills    busPIRone (BUSPAR) 5 MG tablet [Pharmacy Med Name: BUSPIRONE HCL 5 MG TABS 5 Tablet] 60 tablet 1     Sig: TAKE ONE TABLET BY MOUTH TWICE A DAY            Deepthi Philip LPN

## 2024-05-29 RX ORDER — POLYETHYLENE GLYCOL 3350 17 G/17G
POWDER, FOR SOLUTION ORAL
Qty: 510 G | Refills: 11 | Status: SHIPPED | OUTPATIENT
Start: 2024-05-29

## 2024-05-29 RX ORDER — DOCUSATE SODIUM 100 MG/1
100 CAPSULE, LIQUID FILLED ORAL 2 TIMES DAILY
Qty: 120 CAPSULE | Refills: 0 | Status: SHIPPED | OUTPATIENT
Start: 2024-05-29 | End: 2024-07-28

## 2024-05-29 NOTE — TELEPHONE ENCOUNTER
05- Called [facility] that patient is needing a F/U apt in July 2024-last seen July 2023       Called [facility] scheduled for July 10th 2024     Routed to CT APRN

## 2024-06-05 DIAGNOSIS — N92.6 IRREGULAR PERIODS: ICD-10-CM

## 2024-06-05 RX ORDER — DROSPIRENONE AND ETHINYL ESTRADIOL 0.02-3(28)
1 KIT ORAL DAILY
Qty: 28 TABLET | Refills: 2 | Status: SHIPPED | OUTPATIENT
Start: 2024-06-05

## 2024-06-07 DIAGNOSIS — F41.1 GAD (GENERALIZED ANXIETY DISORDER): ICD-10-CM

## 2024-06-07 RX ORDER — BUSPIRONE HYDROCHLORIDE 5 MG/1
TABLET ORAL
Qty: 60 TABLET | Refills: 1 | OUTPATIENT
Start: 2024-06-07

## 2024-07-02 DIAGNOSIS — R73.02 IMPAIRED GLUCOSE TOLERANCE: ICD-10-CM

## 2024-07-02 DIAGNOSIS — E03.1 CONGENITAL HYPOTHYROIDISM WITHOUT GOITER: ICD-10-CM

## 2024-07-02 DIAGNOSIS — E78.2 MIXED HYPERLIPIDEMIA: ICD-10-CM

## 2024-07-02 RX ORDER — LEVOTHYROXINE SODIUM 0.03 MG/1
TABLET ORAL
Qty: 90 TABLET | Refills: 1 | Status: SHIPPED | OUTPATIENT
Start: 2024-07-02

## 2024-07-10 ENCOUNTER — TELEPHONE (OUTPATIENT)
Dept: GASTROENTEROLOGY | Age: 34
End: 2024-07-10

## 2024-07-10 ENCOUNTER — OFFICE VISIT (OUTPATIENT)
Dept: GASTROENTEROLOGY | Age: 34
End: 2024-07-10
Payer: MEDICARE

## 2024-07-10 VITALS
OXYGEN SATURATION: 99 % | HEIGHT: 62 IN | DIASTOLIC BLOOD PRESSURE: 70 MMHG | BODY MASS INDEX: 21.71 KG/M2 | WEIGHT: 118 LBS | HEART RATE: 93 BPM | SYSTOLIC BLOOD PRESSURE: 120 MMHG

## 2024-07-10 DIAGNOSIS — K58.1 IRRITABLE BOWEL SYNDROME WITH CONSTIPATION: Primary | ICD-10-CM

## 2024-07-10 PROCEDURE — 1036F TOBACCO NON-USER: CPT | Performed by: NURSE PRACTITIONER

## 2024-07-10 PROCEDURE — 99214 OFFICE O/P EST MOD 30 MIN: CPT | Performed by: NURSE PRACTITIONER

## 2024-07-10 PROCEDURE — G8427 DOCREV CUR MEDS BY ELIG CLIN: HCPCS | Performed by: NURSE PRACTITIONER

## 2024-07-10 PROCEDURE — G8420 CALC BMI NORM PARAMETERS: HCPCS | Performed by: NURSE PRACTITIONER

## 2024-07-10 NOTE — PROGRESS NOTES
Current Outpatient Medications   Medication Sig Dispense Refill    levothyroxine (SYNTHROID) 25 MCG tablet TAKE ONE TABLET BY MOUTH DAILY. 90 tablet 1    JALYN 3-0.02 MG per tablet TAKE 1 TABLET BY MOUTH DAILY 28 tablet 2    polyethylene glycol (GLYCOLAX) 17 GM/SCOOP powder MIX AND TAKE 17 G BY MOUTH DAILY HOLD FOR EXCESSIVE LOOSE STOOLS 510 g 11    busPIRone (BUSPAR) 5 MG tablet TAKE ONE TABLET BY MOUTH TWICE A DAY 60 tablet 1    Lactobacillus (FLORAJEN ACIDOPHILUS) CAPS TAKE 1 CAPSULE BY MOUTH DAILY 30 capsule 11    LINZESS 290 MCG CAPS capsule TAKE ONE CAPSULE BY MOUTH EVERY MORNING BEFORE BREAKFAST. 30 capsule 6    Incontinence Supply Disposable (A + D PERSONAL CARE WIPES) MISC Use as directed 100 each 2    Disposable Gloves MISC Use as directed 100 each 2    docusate sodium (COLACE) 100 MG capsule Take 1 capsule by mouth 2 times daily 60 capsule 11     No current facility-administered medications for this visit.       Allergies   Allergen Reactions    Latex     Betadine [Povidone Iodine]     Sulfa Antibiotics            Objective:     /70 (Site: Left Upper Arm)   Pulse 93   Ht 1.575 m (5' 2\")   Wt 53.5 kg (118 lb)   SpO2 99%   BMI 21.58 kg/m²     Physical Exam  Vitals reviewed.   Constitutional:       General: She is not in acute distress.     Appearance: She is well-developed.   HENT:      Head: Normocephalic and atraumatic.      Right Ear: External ear normal.      Left Ear: External ear normal.      Nose: Nose normal.   Eyes:      General: No scleral icterus.        Right eye: No discharge.         Left eye: No discharge.      Conjunctiva/sclera: Conjunctivae normal.      Pupils: Pupils are equal, round, and reactive to light.   Cardiovascular:      Rate and Rhythm: Normal rate and regular rhythm.      Heart sounds: Normal heart sounds. No murmur heard.  Pulmonary:      Effort: Pulmonary effort is normal. No respiratory distress.      Breath sounds: Normal breath sounds. No wheezing or

## 2024-07-10 NOTE — TELEPHONE ENCOUNTER
07- [facility] Mother called and stated that the patient will need a new rx on patient Colace QD sent to [facility]       Routed to CT APRN

## 2024-07-11 RX ORDER — DOCUSATE SODIUM 100 MG/1
100 CAPSULE, LIQUID FILLED ORAL 2 TIMES DAILY
Qty: 60 CAPSULE | Refills: 11 | Status: SHIPPED | OUTPATIENT
Start: 2024-07-11 | End: 2025-07-06

## 2024-07-16 ENCOUNTER — OFFICE VISIT (OUTPATIENT)
Dept: PRIMARY CARE CLINIC | Age: 34
End: 2024-07-16
Payer: MEDICARE

## 2024-07-16 ENCOUNTER — TELEPHONE (OUTPATIENT)
Dept: GASTROENTEROLOGY | Age: 34
End: 2024-07-16

## 2024-07-16 VITALS
HEART RATE: 72 BPM | BODY MASS INDEX: 20.38 KG/M2 | WEIGHT: 115 LBS | OXYGEN SATURATION: 100 % | SYSTOLIC BLOOD PRESSURE: 124 MMHG | HEIGHT: 63 IN | TEMPERATURE: 97.2 F | DIASTOLIC BLOOD PRESSURE: 78 MMHG

## 2024-07-16 DIAGNOSIS — K59.01 SLOW TRANSIT CONSTIPATION: Primary | ICD-10-CM

## 2024-07-16 DIAGNOSIS — F41.1 GAD (GENERALIZED ANXIETY DISORDER): ICD-10-CM

## 2024-07-16 DIAGNOSIS — R51.9 NONINTRACTABLE EPISODIC HEADACHE, UNSPECIFIED HEADACHE TYPE: ICD-10-CM

## 2024-07-16 DIAGNOSIS — F84.0 AUTISM DISORDER: Chronic | ICD-10-CM

## 2024-07-16 PROCEDURE — 1036F TOBACCO NON-USER: CPT | Performed by: FAMILY MEDICINE

## 2024-07-16 PROCEDURE — G8427 DOCREV CUR MEDS BY ELIG CLIN: HCPCS | Performed by: FAMILY MEDICINE

## 2024-07-16 PROCEDURE — G8420 CALC BMI NORM PARAMETERS: HCPCS | Performed by: FAMILY MEDICINE

## 2024-07-16 PROCEDURE — 99214 OFFICE O/P EST MOD 30 MIN: CPT | Performed by: FAMILY MEDICINE

## 2024-07-16 NOTE — TELEPHONE ENCOUNTER
----- Message from Nusrat Reid sent at 7/16/2024 11:15 AM CDT -----  Regarding: medication  Contact: [phone]  The script for colace you sent to [facility] is for twice a day and should have been for once a day can you please can you please fix it and send the new script with once a day back to [facility] so I can have her paperwork and and labels all match with the correct script so I don't get a medication error please and thank you.

## 2024-07-16 NOTE — PROGRESS NOTES
abdominal tenderness.   Skin:     Findings: No rash.   Neurological:      Mental Status: She is alert.   Psychiatric:         Attention and Perception: She is inattentive.         Mood and Affect: Mood is not anxious. Affect is inappropriate.         Speech: She is communicative.         Behavior: Behavior is cooperative.         Judgment: Judgment is not inappropriate.      Comments: +echolalia            Assessment & Plan   1. Slow transit constipation  2. SALINA (generalized anxiety disorder)  3. Nonintractable episodic headache, unspecified headache type  4. Autism disorder     Okay to d/c pm dose of Buspirone. If anxiety gets worse okay to restart pm dose at 1/2 tablet  Continue all other maintenance medications  Call with new concerns  Return in about 3 months (around 10/14/2024).    All questions were answered.  Medications, including possible adverse effects, and instructions were reviewed and  understanding was confirmed.   Follow-up recommendations, including when to contact or return to office (ie; if symptoms worsen or fail to improve), were discussed and acknowledged.    Electronically signed by Mishel Cruz MD on 7/16/24 at 10:41 AM CDT

## 2024-07-17 RX ORDER — DOCUSATE SODIUM 100 MG/1
100 CAPSULE, LIQUID FILLED ORAL DAILY
Qty: 30 CAPSULE | Refills: 11 | Status: SHIPPED | OUTPATIENT
Start: 2024-07-17 | End: 2025-07-12

## 2024-07-23 ENCOUNTER — PATIENT MESSAGE (OUTPATIENT)
Dept: PRIMARY CARE CLINIC | Age: 34
End: 2024-07-23

## 2024-07-23 DIAGNOSIS — F41.1 GAD (GENERALIZED ANXIETY DISORDER): Primary | ICD-10-CM

## 2024-07-23 RX ORDER — BUSPIRONE HYDROCHLORIDE 5 MG/1
TABLET ORAL
Qty: 30 TABLET | Refills: 2 | Status: SHIPPED | OUTPATIENT
Start: 2024-07-23

## 2024-07-23 NOTE — TELEPHONE ENCOUNTER
From: Nusrat Reid  To: Dr. Mishel Cruz  Sent: 7/23/2024 9:54 AM CDT  Subject: Nusrat edge prescription     At [facility] last appointment doctor said Okay to d/c pm dose of Buspirone. If anxiety gets worse okay to restart pm dose at 1/2 tablet  Continue all other maintenance medications  Call with new concerns  Return in about 3 months (around 10/14/2024) .. Can you please update this with [facility] so I don't get a medication error.

## 2024-08-22 DIAGNOSIS — N92.6 IRREGULAR PERIODS: ICD-10-CM

## 2024-08-22 RX ORDER — DROSPIRENONE AND ETHINYL ESTRADIOL 0.02-3(28)
1 KIT ORAL DAILY
Qty: 28 TABLET | Refills: 2 | Status: SHIPPED | OUTPATIENT
Start: 2024-08-22

## 2024-10-15 DIAGNOSIS — F41.1 GAD (GENERALIZED ANXIETY DISORDER): ICD-10-CM

## 2024-10-16 ENCOUNTER — OFFICE VISIT (OUTPATIENT)
Dept: PRIMARY CARE CLINIC | Age: 34
End: 2024-10-16

## 2024-10-16 VITALS
OXYGEN SATURATION: 99 % | BODY MASS INDEX: 21.25 KG/M2 | WEIGHT: 115.5 LBS | DIASTOLIC BLOOD PRESSURE: 64 MMHG | HEIGHT: 62 IN | TEMPERATURE: 97.3 F | SYSTOLIC BLOOD PRESSURE: 118 MMHG | HEART RATE: 96 BPM

## 2024-10-16 DIAGNOSIS — F41.1 GAD (GENERALIZED ANXIETY DISORDER): ICD-10-CM

## 2024-10-16 DIAGNOSIS — Z23 FLU VACCINE NEED: ICD-10-CM

## 2024-10-16 DIAGNOSIS — Z74.1 REQUIRES ASSISTANCE WITH ACTIVITIES OF DAILY LIVING (ADL): ICD-10-CM

## 2024-10-16 DIAGNOSIS — F84.0 AUTISM DISORDER: Chronic | ICD-10-CM

## 2024-10-16 DIAGNOSIS — Z00.00 MEDICARE ANNUAL WELLNESS VISIT, SUBSEQUENT: Primary | ICD-10-CM

## 2024-10-16 RX ORDER — BUSPIRONE HYDROCHLORIDE 5 MG/1
TABLET ORAL
Qty: 60 TABLET | Refills: 1 | Status: SHIPPED | OUTPATIENT
Start: 2024-10-16

## 2024-10-16 ASSESSMENT — PATIENT HEALTH QUESTIONNAIRE - PHQ9: DEPRESSION UNABLE TO ASSESS: FUNCTIONAL CAPACITY MOTIVATION LIMITS ACCURACY

## 2024-10-16 NOTE — TELEPHONE ENCOUNTER
Nusrat Reid called to request a refill on her medication.      Last office visit : 7/16/2024   Next office visit : 10/16/2024     Requested Prescriptions     Pending Prescriptions Disp Refills    busPIRone (BUSPAR) 5 MG tablet [Pharmacy Med Name: BUSPIRONE HCL 5 MG TABS 5 Tablet] 60 tablet 1     Sig: TAKE ONE TABLET BY MOUTH TWICE A DAY            Deepthi Philip LPN

## 2024-10-17 ENCOUNTER — PATIENT MESSAGE (OUTPATIENT)
Dept: PRIMARY CARE CLINIC | Age: 34
End: 2024-10-17

## 2024-10-17 DIAGNOSIS — F41.1 GAD (GENERALIZED ANXIETY DISORDER): ICD-10-CM

## 2024-10-17 RX ORDER — BUSPIRONE HYDROCHLORIDE 5 MG/1
TABLET ORAL
Qty: 30 TABLET | Refills: 5 | OUTPATIENT
Start: 2024-10-17

## 2024-10-17 NOTE — TELEPHONE ENCOUNTER
Nusrat Reid called to request a refill on her medication.      Last office visit : 10/16/2024   Next office visit : Visit date not found     Requested Prescriptions     Pending Prescriptions Disp Refills    busPIRone (BUSPAR) 5 MG tablet [Pharmacy Med Name: BUSPIRONE HCL 5 MG TABS 5 Tablet] 30 tablet 5     Sig: TAKE 1 TABLET BY MOUTH DAILY            Deepthi Philip, ALBERTN

## 2024-10-23 DIAGNOSIS — F41.1 GAD (GENERALIZED ANXIETY DISORDER): ICD-10-CM

## 2024-10-23 RX ORDER — BUSPIRONE HYDROCHLORIDE 5 MG/1
TABLET ORAL
Qty: 90 TABLET | Refills: 0 | Status: CANCELLED | OUTPATIENT
Start: 2024-10-23

## 2024-10-23 RX ORDER — BUSPIRONE HYDROCHLORIDE 5 MG/1
TABLET ORAL
Qty: 90 TABLET | Refills: 0 | Status: SHIPPED | OUTPATIENT
Start: 2024-10-23

## 2024-10-23 NOTE — TELEPHONE ENCOUNTER
Nusrat Reid called to request a refill on her medication.      Last office visit : 10/16/2024   Next office visit : 4/16/2025     Requested Prescriptions     Pending Prescriptions Disp Refills    busPIRone (BUSPAR) 5 MG tablet 90 tablet 0     Sig: TAKE ONE TABLET BY MOUTH DAILY            Gladis Payne MA

## 2024-10-23 NOTE — TELEPHONE ENCOUNTER
Nusrat Reid called to request a refill on her medication.      Last office visit : 10/16/2024   Next office visit : Visit date not found     Requested Prescriptions     Pending Prescriptions Disp Refills    busPIRone (BUSPAR) 5 MG tablet 90 tablet 0     Sig: TAKE ONE TABLET BY MOUTH DAILY            Gladis Payne MA

## 2024-11-18 DIAGNOSIS — N92.6 IRREGULAR PERIODS: ICD-10-CM

## 2024-11-18 RX ORDER — DROSPIRENONE AND ETHINYL ESTRADIOL 0.02-3(28)
1 KIT ORAL DAILY
Qty: 28 TABLET | Refills: 2 | Status: SHIPPED | OUTPATIENT
Start: 2024-11-18

## 2024-11-19 RX ORDER — LINACLOTIDE 290 UG/1
1 CAPSULE, GELATIN COATED ORAL
Qty: 30 CAPSULE | Refills: 6 | Status: SHIPPED | OUTPATIENT
Start: 2024-11-19

## 2024-12-17 DIAGNOSIS — F41.1 GAD (GENERALIZED ANXIETY DISORDER): ICD-10-CM

## 2024-12-17 RX ORDER — BUSPIRONE HYDROCHLORIDE 5 MG/1
TABLET ORAL
Qty: 90 TABLET | Refills: 0 | OUTPATIENT
Start: 2024-12-17

## 2024-12-17 NOTE — TELEPHONE ENCOUNTER
Nusrat Reid called to request a refill on her medication.      Last office visit : 10/16/2024   Next office visit : 4/16/2025     Requested Prescriptions     Pending Prescriptions Disp Refills    busPIRone (BUSPAR) 5 MG tablet [Pharmacy Med Name: BUSPIRONE HCL 5 MG TABS 5 Tablet] 90 tablet 0     Sig: TAKE ONE TABLET BY MOUTH DAILY            Deepthi Philip LPN

## 2025-02-04 DIAGNOSIS — N92.6 IRREGULAR PERIODS: ICD-10-CM

## 2025-02-04 RX ORDER — DROSPIRENONE AND ETHINYL ESTRADIOL 0.02-3(28)
1 KIT ORAL DAILY
Qty: 28 TABLET | Refills: 1 | Status: SHIPPED | OUTPATIENT
Start: 2025-02-04

## 2025-02-12 DIAGNOSIS — F41.1 GAD (GENERALIZED ANXIETY DISORDER): ICD-10-CM

## 2025-02-12 RX ORDER — BUSPIRONE HYDROCHLORIDE 5 MG/1
TABLET ORAL
Qty: 90 TABLET | Refills: 0 | Status: SHIPPED | OUTPATIENT
Start: 2025-02-12

## 2025-03-13 DIAGNOSIS — E78.2 MIXED HYPERLIPIDEMIA: ICD-10-CM

## 2025-03-13 DIAGNOSIS — R73.02 IMPAIRED GLUCOSE TOLERANCE: ICD-10-CM

## 2025-03-13 DIAGNOSIS — E03.1 CONGENITAL HYPOTHYROIDISM WITHOUT GOITER: ICD-10-CM

## 2025-03-14 RX ORDER — LEVOTHYROXINE SODIUM 25 UG/1
25 TABLET ORAL DAILY
Qty: 90 TABLET | Refills: 0 | Status: SHIPPED | OUTPATIENT
Start: 2025-03-14

## 2025-04-22 ENCOUNTER — OFFICE VISIT (OUTPATIENT)
Dept: PRIMARY CARE CLINIC | Age: 35
End: 2025-04-22
Payer: MEDICARE

## 2025-04-22 VITALS
HEART RATE: 78 BPM | HEIGHT: 62 IN | TEMPERATURE: 97.7 F | WEIGHT: 127 LBS | OXYGEN SATURATION: 99 % | DIASTOLIC BLOOD PRESSURE: 84 MMHG | SYSTOLIC BLOOD PRESSURE: 118 MMHG | BODY MASS INDEX: 23.37 KG/M2

## 2025-04-22 DIAGNOSIS — K59.01 SLOW TRANSIT CONSTIPATION: ICD-10-CM

## 2025-04-22 DIAGNOSIS — Z74.1 REQUIRES ASSISTANCE WITH ACTIVITIES OF DAILY LIVING (ADL): ICD-10-CM

## 2025-04-22 DIAGNOSIS — Z01.89 ROUTINE LAB DRAW: ICD-10-CM

## 2025-04-22 DIAGNOSIS — F84.0 AUTISM DISORDER: Chronic | ICD-10-CM

## 2025-04-22 DIAGNOSIS — E78.2 MIXED HYPERLIPIDEMIA: ICD-10-CM

## 2025-04-22 DIAGNOSIS — E03.9 ACQUIRED HYPOTHYROIDISM: Primary | ICD-10-CM

## 2025-04-22 PROCEDURE — 1036F TOBACCO NON-USER: CPT | Performed by: FAMILY MEDICINE

## 2025-04-22 PROCEDURE — G8427 DOCREV CUR MEDS BY ELIG CLIN: HCPCS | Performed by: FAMILY MEDICINE

## 2025-04-22 PROCEDURE — 99214 OFFICE O/P EST MOD 30 MIN: CPT | Performed by: FAMILY MEDICINE

## 2025-04-22 PROCEDURE — G8420 CALC BMI NORM PARAMETERS: HCPCS | Performed by: FAMILY MEDICINE

## 2025-04-22 SDOH — ECONOMIC STABILITY: TRANSPORTATION INSECURITY
IN THE PAST 12 MONTHS, HAS THE LACK OF TRANSPORTATION KEPT YOU FROM MEDICAL APPOINTMENTS OR FROM GETTING MEDICATIONS?: NO

## 2025-04-22 SDOH — ECONOMIC STABILITY: INCOME INSECURITY: IN THE LAST 12 MONTHS, WAS THERE A TIME WHEN YOU WERE NOT ABLE TO PAY THE MORTGAGE OR RENT ON TIME?: NO

## 2025-04-22 SDOH — ECONOMIC STABILITY: FOOD INSECURITY: WITHIN THE PAST 12 MONTHS, YOU WORRIED THAT YOUR FOOD WOULD RUN OUT BEFORE YOU GOT MONEY TO BUY MORE.: NEVER TRUE

## 2025-04-22 SDOH — ECONOMIC STABILITY: FOOD INSECURITY: WITHIN THE PAST 12 MONTHS, THE FOOD YOU BOUGHT JUST DIDN'T LAST AND YOU DIDN'T HAVE MONEY TO GET MORE.: NEVER TRUE

## 2025-04-22 ASSESSMENT — PATIENT HEALTH QUESTIONNAIRE - PHQ9
2. FEELING DOWN, DEPRESSED OR HOPELESS: NOT AT ALL
SUM OF ALL RESPONSES TO PHQ QUESTIONS 1-9: 0
1. LITTLE INTEREST OR PLEASURE IN DOING THINGS: NOT AT ALL
2. FEELING DOWN, DEPRESSED OR HOPELESS: NOT AT ALL
SUM OF ALL RESPONSES TO PHQ QUESTIONS 1-9: 0
1. LITTLE INTEREST OR PLEASURE IN DOING THINGS: NOT AT ALL
SUM OF ALL RESPONSES TO PHQ QUESTIONS 1-9: 0
SUM OF ALL RESPONSES TO PHQ9 QUESTIONS 1 & 2: 0
SUM OF ALL RESPONSES TO PHQ QUESTIONS 1-9: 0

## 2025-04-22 NOTE — PROGRESS NOTES
[facility]  [address]  [address]  [place] [address]  Dept: [phone]  Dept Fax: [phone]  Loc: [phone]      Subjective:     Chief Complaint   Patient presents with    Follow-up     Pt presents today for fu. No other issues reported        HPI:  Nusrat Reid is a 35 y.o. female presents today for her routine for follow-up   PMHx and problem list reviewed and update  Chronic meds reviewed and reconciled.No refills needed at this time  She had problems with chronic constipation in the past but her caregiver states that her BM is well controlled now on current regimen.  No new problems voiced    ROS:   Review of Systems   Unable to perform ROS: Psychiatric disorder       PMHx:  Past Medical History:   Diagnosis Date    Anxiety     Autism     Constipation     Hemorrhage of rectum and anus 04/24/2023    Added automatically from request for surgery [id number]    History of underactive thyroid     Impaction of colon (HCC) 04/24/2023     Patient Active Problem List   Diagnosis    Impaired glucose tolerance    Mixed hyperlipidemia    Congenital hypothyroidism without goiter    Autism disorder    Requires assistance with activities of daily living (ADL)    Slow transit constipation       PSHx:  Past Surgical History:   Procedure Laterality Date    BREAST BIOPSY      Seveal years ago    COLONOSCOPY N/A 04/26/2023    Dr Acharya-Examination up to 20 cm only, poor prep    COLONOSCOPY N/A 05/12/2023    Dr Jackson, incomplete sub prep up to rectosigmoid junction very poor prep,    COLONOSCOPY N/A 05/15/2023    Dr Jackson, sm 5-6 mm Benign ulcer in distal rectum near the anorectal junction like from passing hard stools wo stigmata of bleeding, sub prep quality, int hem Gr 1 wo bleeding-at this time, 13 year recall    OTHER SURGICAL HISTORY  07/08/2015    fecal disempaction       PFHx:  Family History   Problem Relation Age of Onset    Cancer Father

## 2025-04-28 DIAGNOSIS — E78.2 MIXED HYPERLIPIDEMIA: ICD-10-CM

## 2025-04-28 DIAGNOSIS — R73.02 IMPAIRED GLUCOSE TOLERANCE: ICD-10-CM

## 2025-04-28 DIAGNOSIS — E03.1 CONGENITAL HYPOTHYROIDISM WITHOUT GOITER: ICD-10-CM

## 2025-04-28 DIAGNOSIS — F41.1 GAD (GENERALIZED ANXIETY DISORDER): ICD-10-CM

## 2025-04-28 DIAGNOSIS — N92.6 IRREGULAR PERIODS: ICD-10-CM

## 2025-04-28 RX ORDER — BUSPIRONE HYDROCHLORIDE 5 MG/1
5 TABLET ORAL DAILY
Qty: 90 TABLET | Refills: 0 | Status: SHIPPED | OUTPATIENT
Start: 2025-04-28

## 2025-04-28 RX ORDER — LEVOTHYROXINE SODIUM 25 UG/1
25 TABLET ORAL DAILY
Qty: 90 TABLET | Refills: 0 | Status: SHIPPED | OUTPATIENT
Start: 2025-04-28

## 2025-04-28 RX ORDER — DROSPIRENONE AND ETHINYL ESTRADIOL 0.02-3(28)
1 KIT ORAL DAILY
Qty: 28 TABLET | Refills: 1 | Status: SHIPPED | OUTPATIENT
Start: 2025-04-28

## 2025-04-28 NOTE — TELEPHONE ENCOUNTER
Nusrat ALIRIO Reid called to request a refill on her medication.      Last office visit : 4/22/2025   Next office visit : 7/22/2025 with ASAD Salazar     Requested Prescriptions     Pending Prescriptions Disp Refills    JALYN 3-0.02 MG per tablet [Pharmacy Med Name: JALYN 3-0.02 MG TABS 3-0.02 Tablet] 28 tablet 1     Sig: TAKE 1 TABLET BY MOUTH DAILY    levothyroxine (SYNTHROID) 25 MCG tablet [Pharmacy Med Name: LEVOTHYROXINE SODIUM 25 MCG 25 Tablet] 90 tablet 0     Sig: TAKE ONE TABLET BY MOUTH DAILY.    busPIRone (BUSPAR) 5 MG tablet [Pharmacy Med Name: BUSPIRONE HCL 5 MG TABS 5 Tablet] 90 tablet 0     Sig: TAKE ONE TABLET BY MOUTH DAILY            Jessica Valiente MA

## 2025-05-30 DIAGNOSIS — N92.6 IRREGULAR PERIODS: ICD-10-CM

## 2025-05-30 RX ORDER — DROSPIRENONE AND ETHINYL ESTRADIOL 0.02-3(28)
1 KIT ORAL DAILY
Qty: 28 TABLET | Refills: 0 | Status: SHIPPED | OUTPATIENT
Start: 2025-05-30

## 2025-05-30 RX ORDER — LINACLOTIDE 290 UG/1
1 CAPSULE, GELATIN COATED ORAL
Qty: 30 CAPSULE | Refills: 6 | Status: SHIPPED | OUTPATIENT
Start: 2025-05-30

## 2025-07-14 SDOH — HEALTH STABILITY: PHYSICAL HEALTH: ON AVERAGE, HOW MANY DAYS PER WEEK DO YOU ENGAGE IN MODERATE TO STRENUOUS EXERCISE (LIKE A BRISK WALK)?: 3 DAYS

## 2025-07-14 SDOH — HEALTH STABILITY: PHYSICAL HEALTH: ON AVERAGE, HOW MANY MINUTES DO YOU ENGAGE IN EXERCISE AT THIS LEVEL?: 0 MIN

## 2025-07-16 ENCOUNTER — OFFICE VISIT (OUTPATIENT)
Dept: PRIMARY CARE CLINIC | Age: 35
End: 2025-07-16

## 2025-07-16 VITALS
TEMPERATURE: 97.2 F | WEIGHT: 108.6 LBS | HEIGHT: 62 IN | DIASTOLIC BLOOD PRESSURE: 80 MMHG | SYSTOLIC BLOOD PRESSURE: 108 MMHG | BODY MASS INDEX: 19.98 KG/M2

## 2025-07-16 DIAGNOSIS — N92.6 IRREGULAR PERIODS: ICD-10-CM

## 2025-07-16 DIAGNOSIS — F41.1 GAD (GENERALIZED ANXIETY DISORDER): ICD-10-CM

## 2025-07-16 DIAGNOSIS — E78.2 MIXED HYPERLIPIDEMIA: ICD-10-CM

## 2025-07-16 DIAGNOSIS — R73.02 IMPAIRED GLUCOSE TOLERANCE: ICD-10-CM

## 2025-07-16 DIAGNOSIS — F84.0 AUTISM DISORDER: ICD-10-CM

## 2025-07-16 DIAGNOSIS — E03.1 CONGENITAL HYPOTHYROIDISM WITHOUT GOITER: ICD-10-CM

## 2025-07-16 DIAGNOSIS — Z76.89 ENCOUNTER TO ESTABLISH CARE: Primary | ICD-10-CM

## 2025-07-16 DIAGNOSIS — E03.9 ACQUIRED HYPOTHYROIDISM: ICD-10-CM

## 2025-07-16 RX ORDER — DROSPIRENONE AND ETHINYL ESTRADIOL 0.02-3(28)
1 KIT ORAL DAILY
Qty: 84 TABLET | Refills: 1 | Status: SHIPPED | OUTPATIENT
Start: 2025-07-16

## 2025-07-16 RX ORDER — BUSPIRONE HYDROCHLORIDE 5 MG/1
5 TABLET ORAL DAILY
Qty: 90 TABLET | Refills: 1 | Status: SHIPPED | OUTPATIENT
Start: 2025-07-16

## 2025-07-16 RX ORDER — LACTOBACILLUS ACIDOPHILUS 20B CELL
CAPSULE ORAL
Qty: 90 CAPSULE | Refills: 1 | Status: SHIPPED | OUTPATIENT
Start: 2025-07-16

## 2025-07-16 RX ORDER — LEVOTHYROXINE SODIUM 25 UG/1
25 TABLET ORAL DAILY
Qty: 90 TABLET | Refills: 1 | Status: SHIPPED | OUTPATIENT
Start: 2025-07-16

## 2025-07-16 ASSESSMENT — ENCOUNTER SYMPTOMS
VOICE CHANGE: 0
NAUSEA: 0
COUGH: 0
COLOR CHANGE: 0
SHORTNESS OF BREATH: 0
VOMITING: 0
PHOTOPHOBIA: 0
BACK PAIN: 0
RHINORRHEA: 0

## 2025-07-16 NOTE — PROGRESS NOTES
Hemorrhage of rectum and anus 04/24/2023    Added automatically from request for surgery [id number]    History of underactive thyroid     Impaction of colon (HCC) 04/24/2023      Past Surgical History:   Procedure Laterality Date    BREAST BIOPSY      Seveal years ago    COLONOSCOPY N/A 04/26/2023    Dr Acharya-Examination up to 20 cm only, poor prep    COLONOSCOPY N/A 05/12/2023    Dr Jackson, incomplete sub prep up to rectosigmoid junction very poor prep,    COLONOSCOPY N/A 05/15/2023    Dr Jackson, sm 5-6 mm Benign ulcer in distal rectum near the anorectal junction like from passing hard stools wo stigmata of bleeding, sub prep quality, int hem Gr 1 wo bleeding-at this time, 13 year recall    OTHER SURGICAL HISTORY  07/08/2015    fecal disempaction           7/16/2025    10:58 AM 4/22/2025     9:40 AM 10/16/2024     9:09 AM 7/16/2024    10:02 AM 7/10/2024     8:28 AM 4/11/2024    10:10 AM   Vitals   SYSTOLIC 108 118 118 124 120 122   DIASTOLIC 80 84 64 78 70 84   BP Site     Left Upper Arm    Pulse  78 96 72 93 83   Temp 97.2 °F (36.2 °C) 97.7 °F (36.5 °C) 97.3 °F (36.3 °C) 97.2 °F (36.2 °C)  97.1 °F (36.2 °C)   SpO2  99 % 99 % 100 % 99 % 98 %   Weight - Scale 108 lb 9.6 oz 127 lb 115 lb 8 oz 115 lb 118 lb 111 lb 12.8 oz   Height 5' 2\" 5' 2\" 5' 2\" 5' 3\" 5' 2\" 5' 3\"   Body Mass Index 19.86 kg/m2 23.23 kg/m2 21.13 kg/m2 20.37 kg/m2 21.58 kg/m2 19.8 kg/m2       Family History   Problem Relation Age of Onset    Cancer Father     Colon Polyps Maternal Aunt     Cancer Maternal Grandfather         ?    Colon Cancer Neg Hx        Social History     Tobacco Use    Smoking status: Never    Smokeless tobacco: Never   Substance Use Topics    Alcohol use: No     Alcohol/week: 0.0 standard drinks of alcohol      Current Outpatient Medications on File Prior to Visit   Medication Sig Dispense Refill    LINZESS 290 MCG CAPS capsule TAKE ONE CAPSULE BY MOUTH EVERY MORNING BEFORE BREAKFAST. 30 capsule 6    polyethylene

## (undated) DEVICE — ENDO KIT,LOURDES HOSPITAL: Brand: MEDLINE INDUSTRIES, INC.

## (undated) DEVICE — GLOVE ORTHO 8   MSG9480

## (undated) DEVICE — AIRWAY CIRCUIT: Brand: DEROYAL

## (undated) DEVICE — AMBU AURAONCE U SIZE 3: Brand: AURAONCE

## (undated) DEVICE — CANNULA NSL AD L7FT DIV O2 CO2 W/ M LUERLOCK TRMPT CONN

## (undated) DEVICE — SINGLE PORT MANIFOLD: Brand: NEPTUNE 2

## (undated) DEVICE — FORCEPS BX L240CM JAW DIA2.4MM ORNG L CAP W/ NDL DISP RAD